# Patient Record
Sex: FEMALE | Race: WHITE | Employment: FULL TIME | URBAN - METROPOLITAN AREA
[De-identification: names, ages, dates, MRNs, and addresses within clinical notes are randomized per-mention and may not be internally consistent; named-entity substitution may affect disease eponyms.]

---

## 2018-02-17 ENCOUNTER — HOSPITAL ENCOUNTER (INPATIENT)
Age: 23
LOS: 1 days | Discharge: HOME | DRG: 603 | End: 2018-02-19
Attending: EMERGENCY MEDICINE | Admitting: INTERNAL MEDICINE
Payer: SUBSIDIZED

## 2018-02-17 DIAGNOSIS — L03.116 CELLULITIS OF LEFT LOWER EXTREMITY: Primary | ICD-10-CM

## 2018-02-17 PROBLEM — D72.825 BANDEMIA: Status: ACTIVE | Noted: 2018-02-17

## 2018-02-17 PROBLEM — E86.9 VOLUME DEPLETION: Status: ACTIVE | Noted: 2018-02-17

## 2018-02-17 PROBLEM — L03.90 CELLULITIS: Status: ACTIVE | Noted: 2018-02-17

## 2018-02-17 LAB
ALBUMIN SERPL-MCNC: 3.4 G/DL (ref 3.5–5)
ALBUMIN/GLOB SERPL: 0.8 {RATIO} (ref 1.2–3.5)
ALP SERPL-CCNC: 82 U/L (ref 50–136)
ALT SERPL-CCNC: 15 U/L (ref 12–65)
ANION GAP SERPL CALC-SCNC: 10 MMOL/L (ref 7–16)
AST SERPL-CCNC: 25 U/L (ref 15–37)
BACTERIA URNS QL MICRO: NORMAL /HPF
BASOPHILS # BLD: 0 K/UL (ref 0–0.2)
BASOPHILS NFR BLD: 0 % (ref 0–2)
BILIRUB SERPL-MCNC: 0.7 MG/DL (ref 0.2–1.1)
BUN SERPL-MCNC: 11 MG/DL (ref 6–23)
CALCIUM SERPL-MCNC: 8.9 MG/DL (ref 8.3–10.4)
CASTS URNS QL MICRO: NORMAL /LPF
CHLORIDE SERPL-SCNC: 105 MMOL/L (ref 98–107)
CO2 SERPL-SCNC: 23 MMOL/L (ref 21–32)
CREAT SERPL-MCNC: 1.06 MG/DL (ref 0.6–1)
DIFFERENTIAL METHOD BLD: ABNORMAL
EOSINOPHIL # BLD: 0 K/UL (ref 0–0.8)
EOSINOPHIL NFR BLD: 0 % (ref 0.5–7.8)
EPI CELLS #/AREA URNS HPF: NORMAL /HPF
ERYTHROCYTE [DISTWIDTH] IN BLOOD BY AUTOMATED COUNT: 13.1 % (ref 11.9–14.6)
FLUAV AG NPH QL IA: NEGATIVE
FLUBV AG NPH QL IA: NEGATIVE
GLOBULIN SER CALC-MCNC: 4.4 G/DL (ref 2.3–3.5)
GLUCOSE SERPL-MCNC: 99 MG/DL (ref 65–100)
HCG UR QL: NEGATIVE
HCT VFR BLD AUTO: 39.1 % (ref 35.8–46.3)
HGB BLD-MCNC: 14 G/DL (ref 11.7–15.4)
IMM GRANULOCYTES # BLD: 0.1 K/UL (ref 0–0.5)
IMM GRANULOCYTES NFR BLD AUTO: 0 % (ref 0–5)
LACTATE BLD-SCNC: 1 MMOL/L (ref 0.5–1.9)
LYMPHOCYTES # BLD: 1.3 K/UL (ref 0.5–4.6)
LYMPHOCYTES NFR BLD: 7 % (ref 13–44)
MCH RBC QN AUTO: 32.3 PG (ref 26.1–32.9)
MCHC RBC AUTO-ENTMCNC: 35.8 G/DL (ref 31.4–35)
MCV RBC AUTO: 90.1 FL (ref 79.6–97.8)
MONOCYTES # BLD: 0.9 K/UL (ref 0.1–1.3)
MONOCYTES NFR BLD: 4 % (ref 4–12)
NEUTS SEG # BLD: 18.2 K/UL (ref 1.7–8.2)
NEUTS SEG NFR BLD: 89 % (ref 43–78)
PLATELET # BLD AUTO: 282 K/UL (ref 150–450)
PMV BLD AUTO: 9.9 FL (ref 10.8–14.1)
POTASSIUM SERPL-SCNC: 4.4 MMOL/L (ref 3.5–5.1)
PROT SERPL-MCNC: 7.8 G/DL (ref 6.3–8.2)
RBC # BLD AUTO: 4.34 M/UL (ref 4.05–5.25)
RBC #/AREA URNS HPF: NORMAL /HPF
SODIUM SERPL-SCNC: 138 MMOL/L (ref 136–145)
WBC # BLD AUTO: 20.5 K/UL (ref 4.3–11.1)
WBC URNS QL MICRO: NORMAL /HPF

## 2018-02-17 PROCEDURE — 87804 INFLUENZA ASSAY W/OPTIC: CPT | Performed by: STUDENT IN AN ORGANIZED HEALTH CARE EDUCATION/TRAINING PROGRAM

## 2018-02-17 PROCEDURE — 99218 HC RM OBSERVATION: CPT

## 2018-02-17 PROCEDURE — 80053 COMPREHEN METABOLIC PANEL: CPT | Performed by: STUDENT IN AN ORGANIZED HEALTH CARE EDUCATION/TRAINING PROGRAM

## 2018-02-17 PROCEDURE — 74011000258 HC RX REV CODE- 258: Performed by: EMERGENCY MEDICINE

## 2018-02-17 PROCEDURE — 99284 EMERGENCY DEPT VISIT MOD MDM: CPT | Performed by: EMERGENCY MEDICINE

## 2018-02-17 PROCEDURE — 85025 COMPLETE CBC W/AUTO DIFF WBC: CPT | Performed by: STUDENT IN AN ORGANIZED HEALTH CARE EDUCATION/TRAINING PROGRAM

## 2018-02-17 PROCEDURE — 96365 THER/PROPH/DIAG IV INF INIT: CPT | Performed by: EMERGENCY MEDICINE

## 2018-02-17 PROCEDURE — 96361 HYDRATE IV INFUSION ADD-ON: CPT | Performed by: EMERGENCY MEDICINE

## 2018-02-17 PROCEDURE — 74011250637 HC RX REV CODE- 250/637: Performed by: INTERNAL MEDICINE

## 2018-02-17 PROCEDURE — 96366 THER/PROPH/DIAG IV INF ADDON: CPT | Performed by: EMERGENCY MEDICINE

## 2018-02-17 PROCEDURE — 81025 URINE PREGNANCY TEST: CPT

## 2018-02-17 PROCEDURE — 74011250636 HC RX REV CODE- 250/636: Performed by: EMERGENCY MEDICINE

## 2018-02-17 PROCEDURE — 87040 BLOOD CULTURE FOR BACTERIA: CPT | Performed by: EMERGENCY MEDICINE

## 2018-02-17 PROCEDURE — 96367 TX/PROPH/DG ADDL SEQ IV INF: CPT | Performed by: EMERGENCY MEDICINE

## 2018-02-17 PROCEDURE — 83605 ASSAY OF LACTIC ACID: CPT

## 2018-02-17 PROCEDURE — 81003 URINALYSIS AUTO W/O SCOPE: CPT | Performed by: EMERGENCY MEDICINE

## 2018-02-17 PROCEDURE — 81015 MICROSCOPIC EXAM OF URINE: CPT | Performed by: EMERGENCY MEDICINE

## 2018-02-17 PROCEDURE — 74011250636 HC RX REV CODE- 250/636: Performed by: INTERNAL MEDICINE

## 2018-02-17 RX ORDER — ENOXAPARIN SODIUM 100 MG/ML
40 INJECTION SUBCUTANEOUS EVERY 24 HOURS
Status: DISCONTINUED | OUTPATIENT
Start: 2018-02-17 | End: 2018-02-19 | Stop reason: HOSPADM

## 2018-02-17 RX ORDER — SODIUM CHLORIDE 0.9 % (FLUSH) 0.9 %
5-10 SYRINGE (ML) INJECTION AS NEEDED
Status: DISCONTINUED | OUTPATIENT
Start: 2018-02-17 | End: 2018-02-19 | Stop reason: HOSPADM

## 2018-02-17 RX ORDER — SODIUM CHLORIDE 0.9 % (FLUSH) 0.9 %
5-10 SYRINGE (ML) INJECTION EVERY 8 HOURS
Status: DISCONTINUED | OUTPATIENT
Start: 2018-02-17 | End: 2018-02-19 | Stop reason: HOSPADM

## 2018-02-17 RX ORDER — ONDANSETRON 2 MG/ML
4 INJECTION INTRAMUSCULAR; INTRAVENOUS
Status: DISCONTINUED | OUTPATIENT
Start: 2018-02-17 | End: 2018-02-19 | Stop reason: HOSPADM

## 2018-02-17 RX ORDER — HYDROCODONE BITARTRATE AND ACETAMINOPHEN 5; 325 MG/1; MG/1
1 TABLET ORAL
Status: DISCONTINUED | OUTPATIENT
Start: 2018-02-17 | End: 2018-02-19 | Stop reason: HOSPADM

## 2018-02-17 RX ORDER — DIPHENHYDRAMINE HCL 25 MG
25 CAPSULE ORAL
Status: DISCONTINUED | OUTPATIENT
Start: 2018-02-17 | End: 2018-02-19 | Stop reason: HOSPADM

## 2018-02-17 RX ORDER — SODIUM CHLORIDE 9 MG/ML
100 INJECTION, SOLUTION INTRAVENOUS CONTINUOUS
Status: DISCONTINUED | OUTPATIENT
Start: 2018-02-17 | End: 2018-02-19 | Stop reason: HOSPADM

## 2018-02-17 RX ORDER — VANCOMYCIN/0.9 % SOD CHLORIDE 1.5G/250ML
1500 PLASTIC BAG, INJECTION (ML) INTRAVENOUS
Status: COMPLETED | OUTPATIENT
Start: 2018-02-17 | End: 2018-02-17

## 2018-02-17 RX ORDER — POLYETHYLENE GLYCOL 3350 17 G/17G
17 POWDER, FOR SOLUTION ORAL
Status: DISCONTINUED | OUTPATIENT
Start: 2018-02-17 | End: 2018-02-19 | Stop reason: HOSPADM

## 2018-02-17 RX ADMIN — VANCOMYCIN HYDROCHLORIDE 1000 MG: 1 INJECTION, POWDER, LYOPHILIZED, FOR SOLUTION INTRAVENOUS at 23:23

## 2018-02-17 RX ADMIN — VANCOMYCIN HYDROCHLORIDE 1500 MG: 10 INJECTION, POWDER, LYOPHILIZED, FOR SOLUTION INTRAVENOUS at 14:54

## 2018-02-17 RX ADMIN — ACETAMINOPHEN 650 MG: 325 SOLUTION ORAL at 16:26

## 2018-02-17 RX ADMIN — HYDROCODONE BITARTRATE AND ACETAMINOPHEN 1 TABLET: 5; 325 TABLET ORAL at 23:02

## 2018-02-17 RX ADMIN — HYDROCODONE BITARTRATE AND ACETAMINOPHEN 1 TABLET: 5; 325 TABLET ORAL at 16:26

## 2018-02-17 RX ADMIN — ENOXAPARIN SODIUM 40 MG: 40 INJECTION SUBCUTANEOUS at 17:56

## 2018-02-17 RX ADMIN — SODIUM CHLORIDE 1000 ML: 900 INJECTION, SOLUTION INTRAVENOUS at 12:48

## 2018-02-17 RX ADMIN — Medication 10 ML: at 17:57

## 2018-02-17 RX ADMIN — Medication 10 ML: at 22:55

## 2018-02-17 RX ADMIN — SODIUM CHLORIDE 100 ML/HR: 900 INJECTION, SOLUTION INTRAVENOUS at 16:26

## 2018-02-17 RX ADMIN — SODIUM CHLORIDE 1000 ML: 900 INJECTION, SOLUTION INTRAVENOUS at 14:01

## 2018-02-17 RX ADMIN — CEFTRIAXONE SODIUM 1 G: 1 INJECTION, POWDER, FOR SOLUTION INTRAMUSCULAR; INTRAVENOUS at 13:00

## 2018-02-17 NOTE — IP AVS SNAPSHOT
Catalinatiana Stone 
 
 
 [address] 
[phone] Patient: Geovanna Rayn MRN: [id number] :1995 About your hospitalization You were admitted on:  2018 You last received care in the:  [facility] 6 MED SURG You were discharged on:  2018 Why you were hospitalized Your primary diagnosis was:  Cellulitis Your diagnoses also included:  Volume Depletion, Bandemia Follow-up Information Follow up With Details Comments Contact Info Harpal Padron MD Schedule an appointment as soon as possible for a visit on 3/1/2018 [place] office at 8:20am. [address] [facility] [address] 
[phone] On 2018 Discharge Orders None A check eva indicates which time of day the medication should be taken. My Medications START taking these medications Instructions Each Dose to Equal  
 Morning Noon Evening Bedtime  
 clindamycin 300 mg capsule Commonly known as:  CLEOCIN Take 1 Cap by mouth three (3) times daily for 8 days. 300 mg Where to Get Your Medications Information on where to get these meds will be given to you by the nurse or doctor. ! Ask your nurse or doctor about these medications  
  clindamycin 300 mg capsule Discharge Instructions DISCHARGE SUMMARY from Nurse PATIENT INSTRUCTIONS: 
 
 
F-face looks uneven A-arms unable to move or move unevenly S-speech slurred or non-existent T-time-call 911 as soon as signs and symptoms begin-DO NOT go  
 Back to bed or wait to see if you get better-TIME IS BRAIN. Warning Signs of HEART ATTACK Call 911 if you have these symptoms: 
? Chest discomfort. Most heart attacks involve discomfort in the center of the chest that lasts more than a few minutes, or that goes away and comes back. It can feel like uncomfortable pressure, squeezing, fullness, or pain. ? Discomfort in other areas of the upper body. Symptoms can include pain or discomfort in one or both arms, the back, neck, jaw, or stomach. ? Shortness of breath with or without chest discomfort. ? Other signs may include breaking out in a cold sweat, nausea, or lightheadedness. Don't wait more than five minutes to call [address]! Fast action can save your life. Calling 911 is almost always the fastest way to get lifesaving treatment. Emergency Medical Services staff can begin treatment when they arrive  up to an hour sooner than if someone gets to the hospital by car. The discharge information has been reviewed with the patient. The patient verbalized understanding. Discharge medications reviewed with the patient and appropriate educational materials and side effects teaching were provided. ___________________________________________________________________________________________________________________________________ Introducing [facility]! [facility] introduces [employer] patient portal. Now you can access parts of your medical record, email your doctor's office, and request medication refills online. 1. In your internet browser, go to [email or web address] 2. Click on the First Time User? Click Here link in the Sign In box. You will see the New Member Sign Up page. 3. Enter your [employer] Access Code exactly as it appears below. You will not need to use this code after youve completed the sign-up process. If you do not sign up before the expiration date, you must request a new code. · [employer] Access Code: [email or web address] Expires: 5/18/2018 11:39 AM 
 
4. Enter the last four digits of your Social Security Number (xxxx) and Date of Birth (mm/dd/yyyy) as indicated and click Submit. You will be taken to the next sign-up page. 5. Create a [employer] ID. This will be your [employer] login ID and cannot be changed, so think of one that is secure and easy to remember. 6. Create a [employer] password. You can change your password at any time. 7. Enter your Password Reset Question and Answer. This can be used at a later time if you forget your password. 8. Enter your e-mail address. You will receive e-mail notification when new information is available in [address] 9. Click Sign Up. You can now view and download portions of your medical record. 10. Click the Download Summary menu link to download a portable copy of your medical information. If you have questions, please visit the Frequently Asked Questions section of the [employer] website. Remember, [employer] is NOT to be used for urgent needs. For medical emergencies, dial 911. Now available from your iPhone and Android! Unresulted Labs-Please follow up with your PCP about these lab tests Order Current Status CULTURE, BLOOD Preliminary result CULTURE, BLOOD Preliminary result Providers Seen During Your Hospitalization Provider Specialty Primary office phone Zane Hester MD Emergency Medicine [phone] Shell Stanley MD Internal Medicine [phone] Your Primary Care Physician (PCP) Primary Care Physician Office Phone Office Fax Ck Nagy, [address] [facility] [phone] You are allergic to the following No active allergies Recent Documentation Height Weight BMI  
  
  
 1.575 m 90.7 kg 36.58 kg/m2 Emergency Contacts Name Discharge Info Relation Home Work Mobile Keshav Santoyo  Spouse [3] [phone] Patient Belongings The following personal items are in your possession at time of discharge: 
  Dental Appliances: None         Home Medications: None   Jewelry: Ring, Necklace  Clothing: Footwear, Shirt, Pants, Socks    Other Valuables: None Discharge Instructions Attachments/References CELLULITIS (ENGLISH) Patient Handouts Cellulitis: Care Instructions Your Care Instructions Cellulitis is a skin infection. It often occurs after a break in the skin from a scrape, cut, bite, or puncture, or after a rash. The doctor has checked you carefully, but problems can develop later. If you notice any problems or new symptoms, get medical treatment right away. Follow-up care is a key part of your treatment and safety. Be sure to make and go to all appointments, and call your doctor if you are having problems. It's also a good idea to know your test results and keep a list of the medicines you take. How can you care for yourself at home? · Take your antibiotics as directed. Do not stop taking them just because you feel better. You need to take the full course of antibiotics. · Prop up the infected area on pillows to reduce pain and swelling. Try to keep the area above the level of your heart as often as you can. · If your doctor told you how to care for your wound, follow your doctor's instructions. If you did not get instructions, follow this general advice: ¨ Wash the wound with clean water 2 times a day. Don't use hydrogen peroxide or alcohol, which can slow healing. ¨ You may cover the wound with a thin layer of petroleum jelly, such as Vaseline, and a nonstick bandage. ¨ Apply more petroleum jelly and replace the bandage as needed. · Be safe with medicines. Take pain medicines exactly as directed. ¨ If the doctor gave you a prescription medicine for pain, take it as prescribed. ¨ If you are not taking a prescription pain medicine, ask your doctor if you can take an over-the-counter medicine. To prevent cellulitis in the future · Try to prevent cuts, scrapes, or other injuries to your skin. Cellulitis most often occurs where there is a break in the skin. · If you get a scrape, cut, mild burn, or bite, wash the wound with clean water as soon as you can to help avoid infection. Don't use hydrogen peroxide or alcohol, which can slow healing. · If you have swelling in your legs (edema), support stockings and good skin care may help prevent leg sores and cellulitis. · Take care of your feet, especially if you have diabetes or other conditions that increase the risk of infection. Wear shoes and socks. Do not go barefoot. If you have athlete's foot or other skin problems on your feet, talk to your doctor about how to treat them. When should you call for help? Call your doctor now or seek immediate medical care if: 
? · You have signs that your infection is getting worse, such as: 
¨ Increased pain, swelling, warmth, or redness. ¨ Red streaks leading from the area. ¨ Pus draining from the area. ¨ A fever. ? · You get a rash. ? Watch closely for changes in your health, and be sure to contact your doctor if: 
? · You are not getting better after 1 day (24 hours). ? · You do not get better as expected. Where can you learn more? Go to http://sylvie-chela.info/. Mica Brown in the search box to learn more about \"Cellulitis: Care Instructions. \" Current as of: October 13, 2016 Content Version: 11.4 © 5778-5487 Ring. Care instructions adapted under license by [employer] (which disclaims liability or warranty for this information). If you have questions about a medical condition or this instruction, always ask your healthcare professional. [address] any warranty or liability for your use of this information. Please provide this summary of care documentation to your next provider. Signatures-by signing, you are acknowledging that this After Visit Summary has been reviewed with you and you have received a copy. Patient Signature:  ____________________________________________________________ Date:  ____________________________________________________________  
  
Briana Jennifer Provider Signature:  ____________________________________________________________ Date:  ____________________________________________________________

## 2018-02-17 NOTE — ED NOTES
Pt's face has become more and more flushed as time as gone on. Pt has now spiked a fever with nonstop chills.

## 2018-02-17 NOTE — ED NOTES
TRANSFER - OUT REPORT:    Verbal report given to Miriam Alvarez RN(name) on Tiffanie  being transferred to [facility](unit) for routine progression of care       Report consisted of patients Situation, Background, Assessment and   Recommendations(SBAR). Information from the following report(s) SBAR, Kardex, ED Summary and Recent Results was reviewed with the receiving nurse. Lines:   Peripheral IV 02/17/18 Left Antecubital (Active)   Site Assessment Clean, dry, & intact 2/17/2018 12:27 PM   Phlebitis Assessment 0 2/17/2018 12:27 PM   Infiltration Assessment 0 2/17/2018 12:27 PM   Dressing Status Clean, dry, & intact 2/17/2018 12:27 PM   Hub Color/Line Status Pink 2/17/2018 12:27 PM   Alcohol Cap Used No 2/17/2018 12:27 PM        Opportunity for questions and clarification was provided.

## 2018-02-17 NOTE — H&P
History and Physical    Patient: Judit Blum MRN: [id number]  SSN: [id number]    YOB: 1995  Age: 20 y.o. Sex: female      Subjective:      Judit Blum is a 20 y.o. female who presents to ER for left lower leg redness and high fever and chills today. She reports similar episodes in September 2016 and 2017. Both times, she was admitted to hospital and given Vancomycin and then discharged home with Clindamycin and infection went away. She denied other past medical history. No recent injury to skin. No insect bite. No pain at the groins. No abdominal pain. No lymphadenopathy. Appetite is poor. She has body ache \" all over\". Past medical history  As mentioned similar episodes in the past.   She was born with one functioning kidney. No CKD. Past surgical history :   None    Family history :   No hereditary condition. No Known Allergies    Review of Systems:  Positive for fever, chills. Body ache,   Poor appetite  Poor oral intake  Leg pain  No chest pain  No shortness of breath, no cough, no sore throat  No abdominal pain, no blood per rectum, no vomiting  No accident  No motor weakness. No impairment of sensation  No dysuria  Not pregnant, no missed period    Objective:     Vitals:    02/17/18 1450 02/17/18 1520 02/17/18 1612 02/17/18 1613   BP: 114/62 119/65 123/71    Pulse:       Resp:       Temp:    100.4 °F (38 °C)   SpO2: 97% 99% 98%    Weight:       Height:            Physical Exam:  General:                    The patient is a pleasant young woman who is acutely ill   Head:                                   Normocephalic/atraumatic. Eyes:                                   No palpebral pallor or scleral icterus. ENT:                                    External auricular and nasal exam within normal limits.                                                Mucous membranes are dry  Neck:                                   Supple, non-tender, no JVD.  Lungs:                       Clear to auscultation bilaterally without wheezes or crackles. No respiratory distress or accessory muscle use. Heart:                                  Regular rate and rhythm, without murmurs, rubs, or gallops. Abdomen:                  Soft, non-tender, non-distended with normoactive bowel sounds. Genitourinary:           No tenderness over the bladder or bilateral CVAs. Extremities:               Without clubbing, cyanosis, or edema. Skin:                                    left lower leg with red rashes from ankle to almost upper shin. Poorly demarcated lesion. no fluctuation. Pulses:                      Radial and dorsalis pedis pulses present 2+ bilaterally. Capillary refill <2s. Neurologic:                CN II-XII grossly intact and symmetrical.                                               Moving all four extremities well with no focal deficits. Psychiatric:                appropriate affect. Alert and oriented x 3    Recent Results (from the past 24 hour(s))   INFLUENZA A & B AG (RAPID TEST)    Collection Time: 02/17/18 12:15 PM   Result Value Ref Range    Influenza A Ag NEGATIVE  NEG      Influenza B Ag NEGATIVE  NEG     CBC WITH AUTOMATED DIFF    Collection Time: 02/17/18 12:24 PM   Result Value Ref Range    WBC 20.5 (H) 4.3 - 11.1 K/uL    RBC 4.34 4.05 - 5.25 M/uL    HGB 14.0 11.7 - 15.4 g/dL    HCT 39.1 35.8 - 46.3 %    MCV 90.1 79.6 - 97.8 FL    MCH 32.3 26.1 - 32.9 PG    MCHC 35.8 (H) 31.4 - 35.0 g/dL    RDW 13.1 11.9 - 14.6 %    PLATELET 737 273 - 329 K/uL    MPV 9.9 (L) 10.8 - 14.1 FL    DF AUTOMATED      NEUTROPHILS 89 (H) 43 - 78 %    LYMPHOCYTES 7 (L) 13 - 44 %    MONOCYTES 4 4.0 - 12.0 %    EOSINOPHILS 0 (L) 0.5 - 7.8 %    BASOPHILS 0 0.0 - 2.0 %    IMMATURE GRANULOCYTES 0 0.0 - 5.0 %    ABS. NEUTROPHILS 18.2 (H) 1.7 - 8.2 K/UL    ABS.  LYMPHOCYTES 1.3 0.5 - 4.6 K/UL    ABS. MONOCYTES 0.9 0.1 - 1.3 K/UL    ABS. EOSINOPHILS 0.0 0.0 - 0.8 K/UL    ABS. BASOPHILS 0.0 0.0 - 0.2 K/UL    ABS. IMM. GRANS. 0.1 0.0 - 0.5 K/UL   METABOLIC PANEL, COMPREHENSIVE    Collection Time: 02/17/18 12:24 PM   Result Value Ref Range    Sodium 138 136 - 145 mmol/L    Potassium 4.4 3.5 - 5.1 mmol/L    Chloride 105 98 - 107 mmol/L    CO2 23 21 - 32 mmol/L    Anion gap 10 7 - 16 mmol/L    Glucose 99 65 - 100 mg/dL    BUN 11 6 - 23 MG/DL    Creatinine 1.06 (H) 0.6 - 1.0 MG/DL    GFR est AA >60 >60 ml/min/1.73m2    GFR est non-AA >60 >60 ml/min/1.73m2    Calcium 8.9 8.3 - 10.4 MG/DL    Bilirubin, total 0.7 0.2 - 1.1 MG/DL    ALT (SGPT) 15 12 - 65 U/L    AST (SGOT) 25 15 - 37 U/L    Alk. phosphatase 82 50 - 136 U/L    Protein, total 7.8 6.3 - 8.2 g/dL    Albumin 3.4 (L) 3.5 - 5.0 g/dL    Globulin 4.4 (H) 2.3 - 3.5 g/dL    A-G Ratio 0.8 (L) 1.2 - 3.5     HCG URINE, QL. - POC    Collection Time: 02/17/18 12:59 PM   Result Value Ref Range    Pregnancy test,urine (POC) NEGATIVE  NEG     URINE MICROSCOPIC    Collection Time: 02/17/18  1:01 PM   Result Value Ref Range    WBC 3-5 0 /hpf    RBC 0-3 0 /hpf    Epithelial cells 10-20 0 /hpf    Bacteria TRACE 0 /hpf    Casts 0-3 0 /lpf   POC LACTIC ACID    Collection Time: 02/17/18  2:25 PM   Result Value Ref Range    Lactic Acid (POC) 1.0 0.5 - 1.9 mmol/L           Assessment:     Hospital Problems  Never Reviewed          Codes Class Noted POA    Cellulitis ICD-10-CM: L03.90  ICD-9-CM: 682.9  2/17/2018 Unknown            Volume depletion  leukocytosis    Plan:     Admit for iv antibiotic and iv fluid  Give symptomatic treatment for pain, fever. DVT prophylaxis with Lovenox  Monitor CBC, BMP  Review record from past admissions. May need further work-ups due to recurrences. I have discussed with patient regarding advance directive. Patient would like to have a full-code status. I have discussed the plan of care with patient.        Signed By: Arianne Medina MD     February 17, 2018

## 2018-02-17 NOTE — PROGRESS NOTES
TRANSFER - IN REPORT:    Verbal report received from Denilson Rapp RN(name) on [place]  being received from ER(unit) for routine progression of care      Report consisted of patients Situation, Background, Assessment and   Recommendations(SBAR). Information from the following report(s) SBAR was reviewed with the receiving nurse. Opportunity for questions and clarification was provided. Assessment completed upon patients arrival to unit and care assumed.

## 2018-02-17 NOTE — ED NOTES
Pt is resting at bedside, two blankets. Pt states she still has the chills, but a third blanket would cause a temperature increase. Pt states she understands.

## 2018-02-17 NOTE — PROGRESS NOTES
Patient being admitted to floor. Brief visit with patient. Saad east. Spouse- Zari Humairaaugusto. No advance directives on file. Will follow.     Lolis Aguilar, staff Jaxson [address], [address] [facility]  /   [email or web address]

## 2018-02-17 NOTE — PROGRESS NOTES
Pharmacokinetic Consult to Pharmacist    Davida Valentine is a 20 y.o. female being treated for SSTI. Height: 5' 2\" (157.5 cm)  Weight: 90.7 kg (200 lb)  Lab Results   Component Value Date/Time    BUN 11 02/17/2018 12:24 PM    Creatinine 1.06 (H) 02/17/2018 12:24 PM    WBC 20.5 (H) 02/17/2018 12:24 PM    Lactic Acid (POC) 1.0 02/17/2018 02:25 PM      Estimated Creatinine Clearance: 86.4 mL/min (based on Cr of 1.06). CULTURES:  All Micro Results     Procedure Component Value Units Date/Time    CULTURE, BLOOD [[id number]] Collected:  02/17/18 1224    Order Status:  Completed Specimen:  Blood from Blood Updated:  02/17/18 1431    CULTURE, BLOOD [[id number]] Collected:  02/17/18 1235    Order Status:  Completed Specimen:  Blood from Blood Updated:  02/17/18 1431    INFLUENZA A & B AG (RAPID TEST) [[id number]] Collected:  02/17/18 1215    Order Status:  Completed Specimen:  Nasopharyngeal from Nasal washing Updated:  02/17/18 1248     Influenza A Ag NEGATIVE          NEGATIVE FOR THE PRESENCE OF INFLUENZA A ANTIGEN  INFECTION DUE TO INFLUENZA A CANNOT BE RULED OUT. BECAUSE THE ANTIGEN PRESENT IN THE SAMPLE MAY BE BELOW  THE DETECTION LIMIT OF THE TEST. A NEGATIVE TEST IS PRESUMPTIVE AND IT IS RECOMMENDED THAT THESE RESULTS BE CONFIRMED BY VIRAL CULTURE OR AN FDA-CLEARED INFLUENZA A AND B MOLECULAR ASSAY. Influenza B Ag NEGATIVE          NEGATIVE FOR THE PRESENCE OF INFLUENZA B ANTIGEN  INFECTION DUE TO INFLUENZA B CANNOT BE RULED OUT. BECAUSE THE ANTIGEN PRESENT IN THE SAMPLE MAY BE BELOW  THE DETECTION LIMIT OF THE TEST. A NEGATIVE TEST IS PRESUMPTIVE AND IT IS RECOMMENDED THAT THESE RESULTS BE CONFIRMED BY VIRAL CULTURE OR AN FDA-CLEARED INFLUENZA A AND B MOLECULAR ASSAY. Day 1 of vancomycin. Goal trough is 12-18. I will schedule 1000mg q8h to begin tonight. Vancomycin 1500mg X1 loading dose received this afternoon. Pharmacist will continue to follow patient.   Please call with any questions. Thank you,  Jaden Plunkett.  Basim Ortiz, PharmD  Clinical Pharmacist  [phone]

## 2018-02-17 NOTE — ED PROVIDER NOTES
HPI Comments: Here with diffuse body aches and temperature up to 102.9 at home. Seeing this fever she took 2 Aleve. Her typical flu symptoms such as cough sore throat or runny nose but does have redness to her left lower extremity from the calf on down. This is been fairly rapidly progressive per patient. She's had cellulitis to the same lower leg once yearly over the last 2 years this being probably the third event. Does not recall any injury or provoking event regarding this. No vomiting. Patient is a 20 y.o. female presenting with general illness. The history is provided by the patient. Generalized Body Aches   This is a new problem. The current episode started 3 to 5 hours ago. The problem has not changed since onset. Associated symptoms comments: Reddened area left lower leg. Nothing aggravates the symptoms. Nothing relieves the symptoms. She has tried nothing for the symptoms. No past medical history on file. No past surgical history on file. No family history on file. Social History     Social History    Marital status:      Spouse name: N/A    Number of children: N/A    Years of education: N/A     Occupational History    Not on file. Social History Main Topics    Smoking status: Not on file    Smokeless tobacco: Not on file    Alcohol use Not on file    Drug use: Not on file    Sexual activity: Not on file     Other Topics Concern    Not on file     Social History Narrative         ALLERGIES: Review of patient's allergies indicates not on file. Review of Systems   Constitutional: Negative for chills and fever. Respiratory: Negative. Cardiovascular: Negative. Genitourinary: Negative. Skin: Positive for color change. Negative for pallor. All other systems reviewed and are negative.       Vitals:    02/17/18 1149   BP: 125/67   Pulse: (!) 140   Resp: 18   Temp: 99.3 °F (37.4 °C)   SpO2: 99%            Physical Exam   Constitutional: She appears well-developed and well-nourished. No distress. HENT:   Head: Atraumatic. Mouth/Throat: Oropharynx is clear and moist.   Eyes: No scleral icterus. Neck: Neck supple. Cardiovascular: Normal rate and intact distal pulses. Pulmonary/Chest: Effort normal. No respiratory distress. She has no wheezes. Abdominal: Soft. There is no tenderness. There is no rebound. Musculoskeletal: She exhibits edema and tenderness. Neurological: She is alert. She exhibits normal muscle tone. Coordination normal.   Skin: Skin is warm and dry. Psychiatric: Her behavior is normal. Thought content normal.   Nursing note and vitals reviewed. MDM  Number of Diagnoses or Management Options  Cellulitis of left lower extremity:   Diagnosis management comments: Tachycardic and new onset of cellulitis to lle. History of same and each of 2 prior time with hospital stay of almost one week.  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

## 2018-02-17 NOTE — PROGRESS NOTES
Primary Nurse Moraima Dallas (Miguel A Ruiz) Lizzy Solis and Flavia Geiger, RN performed a dual skin assessment, no breakdown noted but bilateral hands are dark red, and bilateral legs are bright red and annemarie. Patient complains of tenderness in the left leg and pain on a numeric scale of 5 out of 10 with no need for pain medicine at a five. Patient oriented to the room and the use of the call light with verbal and demonstrated feedback. Patient's heart rate is 125 notified Dr. Ruperto Perez regarding the heart rate. No new orders given. Will continue to monitor patient.

## 2018-02-18 LAB
BASOPHILS # BLD: 0 K/UL (ref 0–0.2)
BASOPHILS NFR BLD: 0 % (ref 0–2)
DIFFERENTIAL METHOD BLD: ABNORMAL
EOSINOPHIL # BLD: 0 K/UL (ref 0–0.8)
EOSINOPHIL NFR BLD: 0 % (ref 0.5–7.8)
ERYTHROCYTE [DISTWIDTH] IN BLOOD BY AUTOMATED COUNT: 13.2 % (ref 11.9–14.6)
HCT VFR BLD AUTO: 35.1 % (ref 35.8–46.3)
HGB BLD-MCNC: 12.2 G/DL (ref 11.7–15.4)
IMM GRANULOCYTES # BLD: 0 K/UL (ref 0–0.5)
IMM GRANULOCYTES NFR BLD AUTO: 0 % (ref 0–5)
LYMPHOCYTES # BLD: 2.3 K/UL (ref 0.5–4.6)
LYMPHOCYTES NFR BLD: 38 % (ref 13–44)
MCH RBC QN AUTO: 31.6 PG (ref 26.1–32.9)
MCHC RBC AUTO-ENTMCNC: 34.8 G/DL (ref 31.4–35)
MCV RBC AUTO: 90.9 FL (ref 79.6–97.8)
MONOCYTES # BLD: 0.4 K/UL (ref 0.1–1.3)
MONOCYTES NFR BLD: 7 % (ref 4–12)
NEUTS SEG # BLD: 3.4 K/UL (ref 1.7–8.2)
NEUTS SEG NFR BLD: 55 % (ref 43–78)
PLATELET # BLD AUTO: 200 K/UL (ref 150–450)
PMV BLD AUTO: 8.9 FL (ref 10.8–14.1)
RBC # BLD AUTO: 3.86 M/UL (ref 4.05–5.25)
VANCOMYCIN TROUGH SERPL-MCNC: 13.6 UG/ML (ref 5–20)
WBC # BLD AUTO: 6.2 K/UL (ref 4.3–11.1)

## 2018-02-18 PROCEDURE — 36415 COLL VENOUS BLD VENIPUNCTURE: CPT | Performed by: INTERNAL MEDICINE

## 2018-02-18 PROCEDURE — 74011250636 HC RX REV CODE- 250/636: Performed by: INTERNAL MEDICINE

## 2018-02-18 PROCEDURE — 74011250637 HC RX REV CODE- 250/637: Performed by: INTERNAL MEDICINE

## 2018-02-18 PROCEDURE — 99218 HC RM OBSERVATION: CPT

## 2018-02-18 PROCEDURE — 80202 ASSAY OF VANCOMYCIN: CPT | Performed by: INTERNAL MEDICINE

## 2018-02-18 PROCEDURE — 85025 COMPLETE CBC W/AUTO DIFF WBC: CPT | Performed by: INTERNAL MEDICINE

## 2018-02-18 PROCEDURE — 65270000029 HC RM PRIVATE

## 2018-02-18 RX ORDER — KETOROLAC TROMETHAMINE 30 MG/ML
15 INJECTION, SOLUTION INTRAMUSCULAR; INTRAVENOUS
Status: COMPLETED | OUTPATIENT
Start: 2018-02-18 | End: 2018-02-18

## 2018-02-18 RX ORDER — NYSTATIN 100000 [USP'U]/G
POWDER TOPICAL 2 TIMES DAILY
Status: DISCONTINUED | OUTPATIENT
Start: 2018-02-18 | End: 2018-02-19 | Stop reason: HOSPADM

## 2018-02-18 RX ADMIN — VANCOMYCIN HYDROCHLORIDE 1000 MG: 1 INJECTION, POWDER, LYOPHILIZED, FOR SOLUTION INTRAVENOUS at 06:09

## 2018-02-18 RX ADMIN — VANCOMYCIN HYDROCHLORIDE 1000 MG: 1 INJECTION, POWDER, LYOPHILIZED, FOR SOLUTION INTRAVENOUS at 14:24

## 2018-02-18 RX ADMIN — HYDROCODONE BITARTRATE AND ACETAMINOPHEN 1 TABLET: 5; 325 TABLET ORAL at 06:34

## 2018-02-18 RX ADMIN — Medication 10 ML: at 06:40

## 2018-02-18 RX ADMIN — KETOROLAC TROMETHAMINE 15 MG: 30 INJECTION, SOLUTION INTRAMUSCULAR at 10:26

## 2018-02-18 RX ADMIN — ENOXAPARIN SODIUM 40 MG: 40 INJECTION SUBCUTANEOUS at 17:18

## 2018-02-18 RX ADMIN — VANCOMYCIN HYDROCHLORIDE 1000 MG: 1 INJECTION, POWDER, LYOPHILIZED, FOR SOLUTION INTRAVENOUS at 22:46

## 2018-02-18 RX ADMIN — Medication 10 ML: at 22:46

## 2018-02-18 RX ADMIN — HYDROCODONE BITARTRATE AND ACETAMINOPHEN 1 TABLET: 5; 325 TABLET ORAL at 18:22

## 2018-02-18 RX ADMIN — NYSTATIN: 100000 POWDER TOPICAL at 17:17

## 2018-02-18 RX ADMIN — SODIUM CHLORIDE 100 ML/HR: 900 INJECTION, SOLUTION INTRAVENOUS at 18:22

## 2018-02-18 RX ADMIN — Medication 10 ML: at 14:24

## 2018-02-18 NOTE — PROGRESS NOTES
Patient had an uneventful night, medicated for pain to her rt. Leg. Redness to rt. Leg  Has spread further. Gracie Krishnan

## 2018-02-18 NOTE — PROGRESS NOTES
As  Hospitalist Progress Note    Subjective:   Daily Progress Note: 2018 10:23 AM    Ms. Ana Bacon is a 19 yo white female who presented  for left leg redness and erythema and is on vancomycin IV for cellulitis. Has had cellulitis twice before in the leg that improved with iv vancomycin/oral clindamycin. Denies trauma to LEs, animal bites/scratches or any IV use. Endorses chest pressure that is worsened with breathing and is reproducible. Not associated with sob, diaphoresis nor exertion. Current Facility-Administered Medications   Medication Dose Route Frequency    ketorolac (TORADOL) injection 15 mg  15 mg IntraVENous NOW    sodium chloride (NS) flush 5-10 mL  5-10 mL IntraVENous Q8H    sodium chloride (NS) flush 5-10 mL  5-10 mL IntraVENous PRN    acetaminophen (TYLENOL) solution 650 mg  650 mg Oral Q6H PRN    enoxaparin (LOVENOX) injection 40 mg  40 mg SubCUTAneous Q24H    0.9% sodium chloride infusion  100 mL/hr IntraVENous CONTINUOUS    HYDROcodone-acetaminophen (NORCO) 5-325 mg per tablet 1 Tab  1 Tab Oral Q6H PRN    ondansetron (ZOFRAN) injection 4 mg  4 mg IntraVENous Q8H PRN    polyethylene glycol (MIRALAX) packet 17 g  17 g Oral DAILY PRN    diphenhydrAMINE (BENADRYL) capsule 25 mg  25 mg Oral QHS PRN    vancomycin (VANCOCIN) 1,000 mg in 0.9% sodium chloride (MBP/ADV) 250 mL  1 g IntraVENous Q8H        Review of Systems  A comprehensive review of systems was negative except for that written in the HPI.     Objective:     Visit Vitals    /79 (BP 1 Location: Right arm, BP Patient Position: At rest)    Pulse 90    Temp 97.8 °F (36.6 °C)    Resp 20    Ht 5' 2\" (1.575 m)    Wt 90.7 kg (200 lb)    SpO2 99%    BMI 36.58 kg/m2      O2 Device: Room air    Temp (24hrs), Av.9 °F (37.2 °C), Min:97.8 °F (36.6 °C), Max:100.4 °F (38 °C)          1901 -  0700  In: 240 [P.O.:240]  Out: -     General: awake, alert, oriented  Eyes; non icteric, EOMI  Neck; supple  CV: RRR  Pulm; CTAB  Ext: left calf mildly erythematous with 1+ pitting edema, palpable pedal pulses    Additional comments:I reviewed the patient's new clinical lab test results. j    Data Review    Recent Results (from the past 24 hour(s))   INFLUENZA A & B AG (RAPID TEST)    Collection Time: 02/17/18 12:15 PM   Result Value Ref Range    Influenza A Ag NEGATIVE  NEG      Influenza B Ag NEGATIVE  NEG     CBC WITH AUTOMATED DIFF    Collection Time: 02/17/18 12:24 PM   Result Value Ref Range    WBC 20.5 (H) 4.3 - 11.1 K/uL    RBC 4.34 4.05 - 5.25 M/uL    HGB 14.0 11.7 - 15.4 g/dL    HCT 39.1 35.8 - 46.3 %    MCV 90.1 79.6 - 97.8 FL    MCH 32.3 26.1 - 32.9 PG    MCHC 35.8 (H) 31.4 - 35.0 g/dL    RDW 13.1 11.9 - 14.6 %    PLATELET 599 367 - 173 K/uL    MPV 9.9 (L) 10.8 - 14.1 FL    DF AUTOMATED      NEUTROPHILS 89 (H) 43 - 78 %    LYMPHOCYTES 7 (L) 13 - 44 %    MONOCYTES 4 4.0 - 12.0 %    EOSINOPHILS 0 (L) 0.5 - 7.8 %    BASOPHILS 0 0.0 - 2.0 %    IMMATURE GRANULOCYTES 0 0.0 - 5.0 %    ABS. NEUTROPHILS 18.2 (H) 1.7 - 8.2 K/UL    ABS. LYMPHOCYTES 1.3 0.5 - 4.6 K/UL    ABS. MONOCYTES 0.9 0.1 - 1.3 K/UL    ABS. EOSINOPHILS 0.0 0.0 - 0.8 K/UL    ABS. BASOPHILS 0.0 0.0 - 0.2 K/UL    ABS. IMM. GRANS. 0.1 0.0 - 0.5 K/UL   METABOLIC PANEL, COMPREHENSIVE    Collection Time: 02/17/18 12:24 PM   Result Value Ref Range    Sodium 138 136 - 145 mmol/L    Potassium 4.4 3.5 - 5.1 mmol/L    Chloride 105 98 - 107 mmol/L    CO2 23 21 - 32 mmol/L    Anion gap 10 7 - 16 mmol/L    Glucose 99 65 - 100 mg/dL    BUN 11 6 - 23 MG/DL    Creatinine 1.06 (H) 0.6 - 1.0 MG/DL    GFR est AA >60 >60 ml/min/1.73m2    GFR est non-AA >60 >60 ml/min/1.73m2    Calcium 8.9 8.3 - 10.4 MG/DL    Bilirubin, total 0.7 0.2 - 1.1 MG/DL    ALT (SGPT) 15 12 - 65 U/L    AST (SGOT) 25 15 - 37 U/L    Alk.  phosphatase 82 50 - 136 U/L    Protein, total 7.8 6.3 - 8.2 g/dL    Albumin 3.4 (L) 3.5 - 5.0 g/dL    Globulin 4.4 (H) 2.3 - 3.5 g/dL    A-G Ratio 0.8 (L) 1.2 - 3.5 CULTURE, BLOOD    Collection Time: 02/17/18 12:24 PM   Result Value Ref Range    Special Requests: LEFT  Antecubital        Culture result: NO GROWTH AFTER 15 HOURS     CULTURE, BLOOD    Collection Time: 02/17/18 12:35 PM   Result Value Ref Range    Special Requests: LEFT  FOREARM        Culture result: NO GROWTH AFTER 15 HOURS     HCG URINE, QL. - POC    Collection Time: 02/17/18 12:59 PM   Result Value Ref Range    Pregnancy test,urine (POC) NEGATIVE  NEG     URINE MICROSCOPIC    Collection Time: 02/17/18  1:01 PM   Result Value Ref Range    WBC 3-5 0 /hpf    RBC 0-3 0 /hpf    Epithelial cells 10-20 0 /hpf    Bacteria TRACE 0 /hpf    Casts 0-3 0 /lpf   POC LACTIC ACID    Collection Time: 02/17/18  2:25 PM   Result Value Ref Range    Lactic Acid (POC) 1.0 0.5 - 1.9 mmol/L   CBC WITH AUTOMATED DIFF    Collection Time: 02/18/18  5:38 AM   Result Value Ref Range    WBC 6.2 4.3 - 11.1 K/uL    RBC 3.86 (L) 4.05 - 5.25 M/uL    HGB 12.2 11.7 - 15.4 g/dL    HCT 35.1 (L) 35.8 - 46.3 %    MCV 90.9 79.6 - 97.8 FL    MCH 31.6 26.1 - 32.9 PG    MCHC 34.8 31.4 - 35.0 g/dL    RDW 13.2 11.9 - 14.6 %    PLATELET 014 703 - 677 K/uL    MPV 8.9 (L) 10.8 - 14.1 FL    DF AUTOMATED      NEUTROPHILS 55 43 - 78 %    LYMPHOCYTES 38 13 - 44 %    MONOCYTES 7 4.0 - 12.0 %    EOSINOPHILS 0 (L) 0.5 - 7.8 %    BASOPHILS 0 0.0 - 2.0 %    IMMATURE GRANULOCYTES 0 0.0 - 5.0 %    ABS. NEUTROPHILS 3.4 1.7 - 8.2 K/UL    ABS. LYMPHOCYTES 2.3 0.5 - 4.6 K/UL    ABS. MONOCYTES 0.4 0.1 - 1.3 K/UL    ABS. EOSINOPHILS 0.0 0.0 - 0.8 K/UL    ABS. BASOPHILS 0.0 0.0 - 0.2 K/UL    ABS. IMM. GRANS. 0.0 0.0 - 0.5 K/UL         Assessment/Plan:     Principal Problem:    Cellulitis (2/17/2018)    Active Problems:    Volume depletion (2/17/2018)      Bandemia (2/17/2018)      Leukocytosis resolved to 6 from 20. Continue iv vancomycin, change to oral clindamycin tomorrow if she continues to improve. toradol 15 mg iv x one for musculoskeletal chest pain.    Home tomorrow?     Care Plan discussed with: Patient/Family and Nurse      Signed By: Vernell Anna MD     February 18, 2018

## 2018-02-18 NOTE — PROGRESS NOTES
Problem: Falls - Risk of  Goal: *Absence of Falls  Document Omi Fall Risk and appropriate interventions in the flowsheet.    Outcome: Progressing Towards Goal  Fall Risk Interventions:            Medication Interventions: Patient to call before getting OOB, Teach patient to arise slowly

## 2018-02-19 VITALS
HEART RATE: 94 BPM | BODY MASS INDEX: 36.8 KG/M2 | HEIGHT: 62 IN | TEMPERATURE: 98.4 F | RESPIRATION RATE: 20 BRPM | OXYGEN SATURATION: 90 % | DIASTOLIC BLOOD PRESSURE: 76 MMHG | WEIGHT: 200 LBS | SYSTOLIC BLOOD PRESSURE: 131 MMHG

## 2018-02-19 PROCEDURE — 74011250637 HC RX REV CODE- 250/637: Performed by: INTERNAL MEDICINE

## 2018-02-19 PROCEDURE — 74011250636 HC RX REV CODE- 250/636: Performed by: INTERNAL MEDICINE

## 2018-02-19 RX ORDER — CLINDAMYCIN HYDROCHLORIDE 300 MG/1
300 CAPSULE ORAL 3 TIMES DAILY
Qty: 24 CAP | Refills: 0 | Status: SHIPPED | OUTPATIENT
Start: 2018-02-19 | End: 2018-02-27

## 2018-02-19 RX ADMIN — NYSTATIN: 100000 POWDER TOPICAL at 09:20

## 2018-02-19 RX ADMIN — SODIUM CHLORIDE 100 ML/HR: 900 INJECTION, SOLUTION INTRAVENOUS at 03:47

## 2018-02-19 RX ADMIN — HYDROCODONE BITARTRATE AND ACETAMINOPHEN 1 TABLET: 5; 325 TABLET ORAL at 09:20

## 2018-02-19 RX ADMIN — Medication 10 ML: at 06:26

## 2018-02-19 RX ADMIN — VANCOMYCIN HYDROCHLORIDE 1000 MG: 1 INJECTION, POWDER, LYOPHILIZED, FOR SOLUTION INTRAVENOUS at 09:19

## 2018-02-19 NOTE — PROGRESS NOTES
Discharge instructions and prescriptions provided and explained to patient, patient voiced understanding. Medication side effect sheet reviewed with pt. No home meds or valuables to return. Opportunity for questions provided. Pt has called ride & Instructed to call once ready to leave the floor.

## 2018-02-19 NOTE — PROGRESS NOTES
Hourly rounds complete with patient throughout the shift. Needs met at this time. Will continue to monitor patient and report off to day shift RN.

## 2018-02-19 NOTE — PROGRESS NOTES
Pt resting quietly in bed. States she is still having pain in chest.  Alert and oriented x4. Lungs sounds clear bilaterally with respirations even and unlabored. Bowel sounds active in all quadrants. +2 pulses bilaterally in upper and lower extremities. Rash on left lower leg. IV infusing with no signs of infiltration or phlebitis. Instructed to call for assistance. Call light in reach. Voiced understanding and identified call light.

## 2018-02-19 NOTE — DISCHARGE INSTRUCTIONS
DISCHARGE SUMMARY from Nurse    PATIENT INSTRUCTIONS:    After general anesthesia or intravenous sedation, for 24 hours or while taking prescription Narcotics:  · Limit your activities  · Do not drive and operate hazardous machinery  · Do not make important personal or business decisions  · Do  not drink alcoholic beverages  · If you have not urinated within 8 hours after discharge, please contact your surgeon on call. Report the following to your surgeon:  · Excessive pain, swelling, redness or odor of or around the surgical area  · Temperature over 100.5  · Nausea and vomiting lasting longer than 4 hours or if unable to take medications  · Any signs of decreased circulation or nerve impairment to extremity: change in color, persistent  numbness, tingling, coldness or increase pain  · Any questions    What to do at Home:  Recommended activity: Activity as tolerated,     If you experience any of the following symptoms fever, chills, new or unrelieved pain, persistent nausea or vomiting, worsening redness or swelling in left leg, please follow up with PCP. *  Please give a list of your current medications to your Primary Care Provider. *  Please update this list whenever your medications are discontinued, doses are      changed, or new medications (including over-the-counter products) are added. *  Please carry medication information at all times in case of emergency situations. These are general instructions for a healthy lifestyle:    No smoking/ No tobacco products/ Avoid exposure to second hand smoke  Surgeon General's Warning:  Quitting smoking now greatly reduces serious risk to your health.     Obesity, smoking, and sedentary lifestyle greatly increases your risk for illness    A healthy diet, regular physical exercise & weight monitoring are important for maintaining a healthy lifestyle    You may be retaining fluid if you have a history of heart failure or if you experience any of the following symptoms:  Weight gain of 3 pounds or more overnight or 5 pounds in a week, increased swelling in our hands or feet or shortness of breath while lying flat in bed. Please call your doctor as soon as you notice any of these symptoms; do not wait until your next office visit. Recognize signs and symptoms of STROKE:    F-face looks uneven    A-arms unable to move or move unevenly    S-speech slurred or non-existent    T-time-call 911 as soon as signs and symptoms begin-DO NOT go       Back to bed or wait to see if you get better-TIME IS BRAIN. Warning Signs of HEART ATTACK     Call 911 if you have these symptoms:   Chest discomfort. Most heart attacks involve discomfort in the center of the chest that lasts more than a few minutes, or that goes away and comes back. It can feel like uncomfortable pressure, squeezing, fullness, or pain.  Discomfort in other areas of the upper body. Symptoms can include pain or discomfort in one or both arms, the back, neck, jaw, or stomach.  Shortness of breath with or without chest discomfort.  Other signs may include breaking out in a cold sweat, nausea, or lightheadedness. Don't wait more than five minutes to call 911 - MINUTES MATTER! Fast action can save your life. Calling 911 is almost always the fastest way to get lifesaving treatment. Emergency Medical Services staff can begin treatment when they arrive -- up to an hour sooner than if someone gets to the hospital by car. The discharge information has been reviewed with the patient. The patient verbalized understanding. Discharge medications reviewed with the patient and appropriate educational materials and side effects teaching were provided.   ___________________________________________________________________________________________________________________________________

## 2018-02-19 NOTE — DISCHARGE SUMMARY
Physician Discharge Summary       Patient: Keven Smith MRN: [id number]  SSN: [id number]    YOB: 1995  Age: 20 y.o. Sex: female    PCP: Marcelino Bal MD    Allergies: Review of patient's allergies indicates no known allergies. Admit date: 2/17/2018  Admitting Provider: Shivani Stanton MD    Discharge date: 2/19/2018  Discharging Provider: Shivani Stanton MD    * Admission Diagnoses: Cellulitis  Cellulitis    * Discharge Diagnoses:    Hospital Problems as of 2/19/2018  Never Reviewed          Codes Class Noted - Resolved POA    * (Principal)Cellulitis ICD-10-CM: L03.90  ICD-9-CM: 682.9  2/17/2018 - Present Unknown        Volume depletion ICD-10-CM: E86.9  ICD-9-CM: 276.50  2/17/2018 - Present Unknown        Bandemia ICD-10-CM: D72.825  ICD-9-CM: 288.66  2/17/2018 - Present Unknown              * Hospital Course:     Ms. Naida Nassar is a 21 yo white female who presented 2/17 for left leg redness and erythema and is on vancomycin IV for cellulitis. Has had cellulitis twice before in the leg that improved with iv vancomycin/oral clindamycin. Denies trauma to LEs, animal bites/scratches or any IV use. Endorses chest pressure that is worsened with breathing and is reproducible. Not associated with sob, diaphoresis nor exertion. Improves with NSAIDS, believed to be musculoskeletal in etiology. She is being changed to oral clindamycin to complete a ten day course as she is now afebrile without a leukocytosis. She is medically stable for discharge.      Discharge Exam:    General: awake, alert, oriented  Eyes; non icteric  Neck; supple  CV: RRR  Pulm; CTAB  Abd; soft, non distended, active BS  Ext: left calf with area of warmth/erythema and tenderness with 1+ edema, palpable pedal pulses bilaterally    * Discharge Condition: stable  * Disposition: Home    Discharge Medications:  Current Discharge Medication List      START taking these medications    Details   clindamycin (CLEOCIN) 300 mg capsule Take 1 Cap by mouth three (3) times daily for 8 days. Qty: 24 Cap, Refills: 0             * Follow-up Care/Patient Instructions:   Activity: ad emelyn  Diet: regular    Follow-up Information     Follow up With Details Comments [facility] Jose Ramesh MD Schedule an appointment as soon as possible for a visit on 3/1/2018 [facility] office at 8:20am. [address] [address]  [phone]       On 2/19/2018          35 minutes spent in discharge planning and coordination of care    Signed:  Nayeli Moreau MD  2/19/2018  10:26 AM

## 2018-02-19 NOTE — PROGRESS NOTES
[address] February 19, 2018       RE: Alisa Horton      To Whom It May Concern,    This is to certify that Alisa Horton was hospitalized from February 1-19, 2018. She may return to school on Thursday February 22, 2018. Please feel free to contact my office if you have any questions or concerns. Thank you for your assistance in this matter.  [phone]    Sincerely,  Jeremiah Carranza RN

## 2018-02-22 LAB
BACTERIA SPEC CULT: NORMAL
BACTERIA SPEC CULT: NORMAL
SERVICE CMNT-IMP: NORMAL
SERVICE CMNT-IMP: NORMAL

## 2019-07-29 PROBLEM — E66.01 SEVERE OBESITY (HCC): Status: ACTIVE | Noted: 2019-07-29

## 2019-08-07 ENCOUNTER — HOSPITAL ENCOUNTER (OUTPATIENT)
Dept: SURGERY | Age: 24
Discharge: HOME | End: 2019-08-07
Attending: OBSTETRICS & GYNECOLOGY
Payer: COMMERCIAL

## 2019-08-07 VITALS
HEART RATE: 85 BPM | TEMPERATURE: 97.1 F | BODY MASS INDEX: 36.85 KG/M2 | RESPIRATION RATE: 16 BRPM | OXYGEN SATURATION: 99 % | HEIGHT: 62 IN | SYSTOLIC BLOOD PRESSURE: 116 MMHG | DIASTOLIC BLOOD PRESSURE: 75 MMHG | WEIGHT: 200.25 LBS

## 2019-08-07 LAB — HGB BLD-MCNC: 13.8 G/DL (ref 11.7–15.4)

## 2019-08-07 PROCEDURE — 85018 HEMOGLOBIN: CPT

## 2019-08-07 NOTE — PERIOP NOTES
Patient verified name and     Order for consent NOT found in EHR and unable to match consent with case posting; patient verified. Type 2 surgery, walk in assessment complete. Labs per surgeon: unknown, no orders; Labs per anesthesia protocol: hemoglobin; results 13.8 and within anesthesia guidelines. EKG: none needed per protocol    Hospital approved surgical skin cleanser and instructions given per hospital policy. Patient provided with and instructed on educational handouts including Guide to Surgery, Pain Management, Hand Hygiene, Blood Transfusion Education, and [facility] Brochure. Patient answered medical/surgical history questions at their best of ability. All prior to admission medications documented in [facility]. Original medication prescription bottle none visualized during patient appointment. Patient instructed to hold all vitamins 7 days prior to surgery and NSAIDS 5 days prior to surgery, patient verbalized understanding. Prescription medications to hold: none    Patient instructed to continue previous medications as prescribed prior to surgery and to take the following medications the day of surgery according to anesthesia guidelines with a small sip of water: none. Patient teach back successful and patient demonstrates knowledge of instructions.

## 2019-08-12 ENCOUNTER — ANESTHESIA EVENT (OUTPATIENT)
Dept: SURGERY | Age: 24
End: 2019-08-12
Payer: COMMERCIAL

## 2019-08-13 ENCOUNTER — HOSPITAL ENCOUNTER (OUTPATIENT)
Age: 24
Discharge: HOME | End: 2019-08-14
Attending: OBSTETRICS & GYNECOLOGY | Admitting: OBSTETRICS & GYNECOLOGY
Payer: COMMERCIAL

## 2019-08-13 ENCOUNTER — ANESTHESIA (OUTPATIENT)
Dept: SURGERY | Age: 24
End: 2019-08-13
Payer: COMMERCIAL

## 2019-08-13 DIAGNOSIS — G89.18 POSTOPERATIVE PAIN: Primary | ICD-10-CM

## 2019-08-13 PROBLEM — R10.2 PELVIC PAIN: Status: ACTIVE | Noted: 2019-08-13

## 2019-08-13 LAB — HCG UR QL: NEGATIVE

## 2019-08-13 PROCEDURE — 77030020255 HC SOL INJ LR 1000ML BG: Performed by: OBSTETRICS & GYNECOLOGY

## 2019-08-13 PROCEDURE — 99218 HC RM OBSERVATION: CPT

## 2019-08-13 PROCEDURE — 77030035277 HC OBTRTR BLDELSS DISP INTU -B: Performed by: OBSTETRICS & GYNECOLOGY

## 2019-08-13 PROCEDURE — 77030019940 HC BLNKT HYPOTHRM STRY -B: Performed by: ANESTHESIOLOGY

## 2019-08-13 PROCEDURE — 77030008522 HC TBNG INSUF LAPRO STRY -B: Performed by: OBSTETRICS & GYNECOLOGY

## 2019-08-13 PROCEDURE — 74011250636 HC RX REV CODE- 250/636: Performed by: ANESTHESIOLOGY

## 2019-08-13 PROCEDURE — 77030022704 HC SUT VLOC COVD -B: Performed by: OBSTETRICS & GYNECOLOGY

## 2019-08-13 PROCEDURE — 74011250636 HC RX REV CODE- 250/636: Performed by: OBSTETRICS & GYNECOLOGY

## 2019-08-13 PROCEDURE — 77030014650 HC SEAL MTRX FLOSEL BAXT -C: Performed by: OBSTETRICS & GYNECOLOGY

## 2019-08-13 PROCEDURE — 77030008756 HC TU IRR SUC STRY -B: Performed by: OBSTETRICS & GYNECOLOGY

## 2019-08-13 PROCEDURE — 76210000016 HC OR PH I REC 1 TO 1.5 HR: Performed by: OBSTETRICS & GYNECOLOGY

## 2019-08-13 PROCEDURE — 77030016151 HC PROTCTR LNS DFOG COVD -B: Performed by: OBSTETRICS & GYNECOLOGY

## 2019-08-13 PROCEDURE — 77030010517 HC APPL SEAL FLOSEL BAXT -B: Performed by: OBSTETRICS & GYNECOLOGY

## 2019-08-13 PROCEDURE — 77030008771 HC TU NG SALEM SUMP -A: Performed by: ANESTHESIOLOGY

## 2019-08-13 PROCEDURE — 77030031139 HC SUT VCRL2 J&J -A: Performed by: OBSTETRICS & GYNECOLOGY

## 2019-08-13 PROCEDURE — 77030027138 HC INCENT SPIROMETER -A

## 2019-08-13 PROCEDURE — 76010000875 HC OR TIME 1.5 TO 2HR INTENSV - TIER 2: Performed by: OBSTETRICS & GYNECOLOGY

## 2019-08-13 PROCEDURE — 81025 URINE PREGNANCY TEST: CPT

## 2019-08-13 PROCEDURE — 74011000250 HC RX REV CODE- 250

## 2019-08-13 PROCEDURE — 77030012770 HC TRCR OPT FX AMR -B: Performed by: OBSTETRICS & GYNECOLOGY

## 2019-08-13 PROCEDURE — 77030020703 HC SEAL CANN DISP INTU -B: Performed by: OBSTETRICS & GYNECOLOGY

## 2019-08-13 PROCEDURE — 77030034696 HC CATH URETH FOL 2W BARD -A: Performed by: OBSTETRICS & GYNECOLOGY

## 2019-08-13 PROCEDURE — 74011250636 HC RX REV CODE- 250/636

## 2019-08-13 PROCEDURE — 76060000034 HC ANESTHESIA 1.5 TO 2 HR: Performed by: OBSTETRICS & GYNECOLOGY

## 2019-08-13 PROCEDURE — 77030018846 HC SOL IRR STRL H20 ICUM -A: Performed by: OBSTETRICS & GYNECOLOGY

## 2019-08-13 PROCEDURE — 74011250637 HC RX REV CODE- 250/637: Performed by: ANESTHESIOLOGY

## 2019-08-13 PROCEDURE — 77030032490 HC SLV COMPR SCD KNE COVD -B: Performed by: OBSTETRICS & GYNECOLOGY

## 2019-08-13 PROCEDURE — 77030019908 HC STETH ESOPH SIMS -A: Performed by: ANESTHESIOLOGY

## 2019-08-13 PROCEDURE — 77030010545: Performed by: OBSTETRICS & GYNECOLOGY

## 2019-08-13 PROCEDURE — 77030035279 HC SEAL VSL ENDOWR XI INTU -I2: Performed by: OBSTETRICS & GYNECOLOGY

## 2019-08-13 PROCEDURE — 74011250637 HC RX REV CODE- 250/637: Performed by: OBSTETRICS & GYNECOLOGY

## 2019-08-13 PROCEDURE — 77030037088 HC TUBE ENDOTRACH ORAL NSL COVD-A: Performed by: ANESTHESIOLOGY

## 2019-08-13 PROCEDURE — 88307 TISSUE EXAM BY PATHOLOGIST: CPT

## 2019-08-13 PROCEDURE — 77030039425 HC BLD LARYNG TRULITE DISP TELE -A: Performed by: ANESTHESIOLOGY

## 2019-08-13 PROCEDURE — 94760 N-INVAS EAR/PLS OXIMETRY 1: CPT

## 2019-08-13 PROCEDURE — 77030002933 HC SUT MCRYL J&J -A: Performed by: OBSTETRICS & GYNECOLOGY

## 2019-08-13 PROCEDURE — 77030018778 HC MANIP UTER VCAR CNMD -B: Performed by: OBSTETRICS & GYNECOLOGY

## 2019-08-13 PROCEDURE — 74011000250 HC RX REV CODE- 250: Performed by: OBSTETRICS & GYNECOLOGY

## 2019-08-13 PROCEDURE — 77030003578 HC NDL INSUF VERES AMR -B: Performed by: OBSTETRICS & GYNECOLOGY

## 2019-08-13 RX ORDER — GABAPENTIN 300 MG/1
600 CAPSULE ORAL ONCE
Status: COMPLETED | OUTPATIENT
Start: 2019-08-13 | End: 2019-08-13

## 2019-08-13 RX ORDER — ATROPA BELLADONNA AND OPIUM 16.2; 6 MG/1; MG/1
SUPPOSITORY RECTAL AS NEEDED
Status: DISCONTINUED | OUTPATIENT
Start: 2019-08-13 | End: 2019-08-13 | Stop reason: HOSPADM

## 2019-08-13 RX ORDER — FLUMAZENIL 0.1 MG/ML
0.2 INJECTION INTRAVENOUS AS NEEDED
Status: DISCONTINUED | OUTPATIENT
Start: 2019-08-13 | End: 2019-08-13 | Stop reason: HOSPADM

## 2019-08-13 RX ORDER — LIDOCAINE HYDROCHLORIDE 10 MG/ML
0.1 INJECTION INFILTRATION; PERINEURAL AS NEEDED
Status: DISCONTINUED | OUTPATIENT
Start: 2019-08-13 | End: 2019-08-13 | Stop reason: HOSPADM

## 2019-08-13 RX ORDER — LIDOCAINE HYDROCHLORIDE 20 MG/ML
INJECTION, SOLUTION EPIDURAL; INFILTRATION; INTRACAUDAL; PERINEURAL AS NEEDED
Status: DISCONTINUED | OUTPATIENT
Start: 2019-08-13 | End: 2019-08-13 | Stop reason: HOSPADM

## 2019-08-13 RX ORDER — OXYCODONE AND ACETAMINOPHEN 5; 325 MG/1; MG/1
1 TABLET ORAL
Status: DISCONTINUED | OUTPATIENT
Start: 2019-08-13 | End: 2019-08-14 | Stop reason: HOSPADM

## 2019-08-13 RX ORDER — NEOSTIGMINE METHYLSULFATE 1 MG/ML
INJECTION INTRAVENOUS AS NEEDED
Status: DISCONTINUED | OUTPATIENT
Start: 2019-08-13 | End: 2019-08-13 | Stop reason: HOSPADM

## 2019-08-13 RX ORDER — ACETAMINOPHEN 10 MG/ML
INJECTION, SOLUTION INTRAVENOUS AS NEEDED
Status: DISCONTINUED | OUTPATIENT
Start: 2019-08-13 | End: 2019-08-13 | Stop reason: HOSPADM

## 2019-08-13 RX ORDER — NALOXONE HYDROCHLORIDE 0.4 MG/ML
0.4 INJECTION, SOLUTION INTRAMUSCULAR; INTRAVENOUS; SUBCUTANEOUS AS NEEDED
Status: DISCONTINUED | OUTPATIENT
Start: 2019-08-13 | End: 2019-08-14 | Stop reason: HOSPADM

## 2019-08-13 RX ORDER — SODIUM CHLORIDE 0.9 % (FLUSH) 0.9 %
5-40 SYRINGE (ML) INJECTION EVERY 8 HOURS
Status: DISCONTINUED | OUTPATIENT
Start: 2019-08-13 | End: 2019-08-14 | Stop reason: HOSPADM

## 2019-08-13 RX ORDER — DEXAMETHASONE SODIUM PHOSPHATE 4 MG/ML
INJECTION, SOLUTION INTRA-ARTICULAR; INTRALESIONAL; INTRAMUSCULAR; INTRAVENOUS; SOFT TISSUE AS NEEDED
Status: DISCONTINUED | OUTPATIENT
Start: 2019-08-13 | End: 2019-08-13 | Stop reason: HOSPADM

## 2019-08-13 RX ORDER — DOCUSATE SODIUM 100 MG/1
100 CAPSULE, LIQUID FILLED ORAL 2 TIMES DAILY
Status: DISCONTINUED | OUTPATIENT
Start: 2019-08-13 | End: 2019-08-14 | Stop reason: HOSPADM

## 2019-08-13 RX ORDER — OXYCODONE HYDROCHLORIDE 5 MG/1
5 TABLET ORAL
Status: DISCONTINUED | OUTPATIENT
Start: 2019-08-13 | End: 2019-08-13 | Stop reason: HOSPADM

## 2019-08-13 RX ORDER — KETOROLAC TROMETHAMINE 30 MG/ML
30 INJECTION, SOLUTION INTRAMUSCULAR; INTRAVENOUS
Status: DISPENSED | OUTPATIENT
Start: 2019-08-13 | End: 2019-08-14

## 2019-08-13 RX ORDER — NALOXONE HYDROCHLORIDE 0.4 MG/ML
0.1 INJECTION, SOLUTION INTRAMUSCULAR; INTRAVENOUS; SUBCUTANEOUS
Status: DISCONTINUED | OUTPATIENT
Start: 2019-08-13 | End: 2019-08-13 | Stop reason: HOSPADM

## 2019-08-13 RX ORDER — OXYCODONE HYDROCHLORIDE 5 MG/1
10 TABLET ORAL
Status: DISCONTINUED | OUTPATIENT
Start: 2019-08-13 | End: 2019-08-13 | Stop reason: HOSPADM

## 2019-08-13 RX ORDER — SODIUM CHLORIDE 0.9 % (FLUSH) 0.9 %
5-40 SYRINGE (ML) INJECTION AS NEEDED
Status: DISCONTINUED | OUTPATIENT
Start: 2019-08-13 | End: 2019-08-14 | Stop reason: HOSPADM

## 2019-08-13 RX ORDER — MIDAZOLAM HYDROCHLORIDE 1 MG/ML
2 INJECTION, SOLUTION INTRAMUSCULAR; INTRAVENOUS
Status: COMPLETED | OUTPATIENT
Start: 2019-08-13 | End: 2019-08-13

## 2019-08-13 RX ORDER — PROPOFOL 10 MG/ML
INJECTION, EMULSION INTRAVENOUS AS NEEDED
Status: DISCONTINUED | OUTPATIENT
Start: 2019-08-13 | End: 2019-08-13 | Stop reason: HOSPADM

## 2019-08-13 RX ORDER — GLYCOPYRROLATE 0.2 MG/ML
INJECTION INTRAMUSCULAR; INTRAVENOUS AS NEEDED
Status: DISCONTINUED | OUTPATIENT
Start: 2019-08-13 | End: 2019-08-13 | Stop reason: HOSPADM

## 2019-08-13 RX ORDER — BUPIVACAINE HYDROCHLORIDE 5 MG/ML
INJECTION, SOLUTION EPIDURAL; INTRACAUDAL AS NEEDED
Status: DISCONTINUED | OUTPATIENT
Start: 2019-08-13 | End: 2019-08-13 | Stop reason: HOSPADM

## 2019-08-13 RX ORDER — DIPHENHYDRAMINE HYDROCHLORIDE 50 MG/ML
12.5 INJECTION, SOLUTION INTRAMUSCULAR; INTRAVENOUS
Status: DISCONTINUED | OUTPATIENT
Start: 2019-08-13 | End: 2019-08-13 | Stop reason: HOSPADM

## 2019-08-13 RX ORDER — FENTANYL CITRATE 50 UG/ML
INJECTION, SOLUTION INTRAMUSCULAR; INTRAVENOUS AS NEEDED
Status: DISCONTINUED | OUTPATIENT
Start: 2019-08-13 | End: 2019-08-13 | Stop reason: HOSPADM

## 2019-08-13 RX ORDER — IBUPROFEN 200 MG
1 TABLET ORAL EVERY 24 HOURS
Status: DISCONTINUED | OUTPATIENT
Start: 2019-08-13 | End: 2019-08-14 | Stop reason: HOSPADM

## 2019-08-13 RX ORDER — ROCURONIUM BROMIDE 10 MG/ML
INJECTION, SOLUTION INTRAVENOUS AS NEEDED
Status: DISCONTINUED | OUTPATIENT
Start: 2019-08-13 | End: 2019-08-13 | Stop reason: HOSPADM

## 2019-08-13 RX ORDER — CEFAZOLIN SODIUM/WATER 2 G/20 ML
2 SYRINGE (ML) INTRAVENOUS ONCE
Status: COMPLETED | OUTPATIENT
Start: 2019-08-13 | End: 2019-08-13

## 2019-08-13 RX ORDER — DIPHENHYDRAMINE HYDROCHLORIDE 50 MG/ML
12.5 INJECTION, SOLUTION INTRAMUSCULAR; INTRAVENOUS
Status: DISCONTINUED | OUTPATIENT
Start: 2019-08-13 | End: 2019-08-14 | Stop reason: HOSPADM

## 2019-08-13 RX ORDER — SODIUM CHLORIDE, SODIUM LACTATE, POTASSIUM CHLORIDE, CALCIUM CHLORIDE 600; 310; 30; 20 MG/100ML; MG/100ML; MG/100ML; MG/100ML
75 INJECTION, SOLUTION INTRAVENOUS CONTINUOUS
Status: DISCONTINUED | OUTPATIENT
Start: 2019-08-13 | End: 2019-08-13 | Stop reason: HOSPADM

## 2019-08-13 RX ORDER — HYDROMORPHONE HYDROCHLORIDE 2 MG/ML
0.5 INJECTION, SOLUTION INTRAMUSCULAR; INTRAVENOUS; SUBCUTANEOUS
Status: DISCONTINUED | OUTPATIENT
Start: 2019-08-13 | End: 2019-08-13 | Stop reason: HOSPADM

## 2019-08-13 RX ORDER — LORAZEPAM 1 MG/1
1 TABLET ORAL
Status: DISCONTINUED | OUTPATIENT
Start: 2019-08-13 | End: 2019-08-14 | Stop reason: HOSPADM

## 2019-08-13 RX ORDER — HYDROMORPHONE HYDROCHLORIDE 2 MG/ML
1 INJECTION, SOLUTION INTRAMUSCULAR; INTRAVENOUS; SUBCUTANEOUS
Status: DISCONTINUED | OUTPATIENT
Start: 2019-08-13 | End: 2019-08-14 | Stop reason: HOSPADM

## 2019-08-13 RX ORDER — ONDANSETRON 2 MG/ML
INJECTION INTRAMUSCULAR; INTRAVENOUS AS NEEDED
Status: DISCONTINUED | OUTPATIENT
Start: 2019-08-13 | End: 2019-08-13 | Stop reason: HOSPADM

## 2019-08-13 RX ADMIN — MIDAZOLAM 2 MG: 1 INJECTION INTRAMUSCULAR; INTRAVENOUS at 10:05

## 2019-08-13 RX ADMIN — NEOSTIGMINE METHYLSULFATE 3 MG: 1 INJECTION INTRAVENOUS at 11:52

## 2019-08-13 RX ADMIN — FENTANYL CITRATE 100 MCG: 50 INJECTION, SOLUTION INTRAMUSCULAR; INTRAVENOUS at 10:45

## 2019-08-13 RX ADMIN — OXYCODONE HYDROCHLORIDE AND ACETAMINOPHEN 1 TABLET: 5; 325 TABLET ORAL at 17:33

## 2019-08-13 RX ADMIN — HYDROMORPHONE HYDROCHLORIDE 1 MG: 2 INJECTION INTRAMUSCULAR; INTRAVENOUS; SUBCUTANEOUS at 23:00

## 2019-08-13 RX ADMIN — ACETAMINOPHEN 1000 MG: 10 INJECTION, SOLUTION INTRAVENOUS at 11:41

## 2019-08-13 RX ADMIN — FENTANYL CITRATE 50 MCG: 50 INJECTION, SOLUTION INTRAMUSCULAR; INTRAVENOUS at 11:06

## 2019-08-13 RX ADMIN — FENTANYL CITRATE 50 MCG: 50 INJECTION, SOLUTION INTRAMUSCULAR; INTRAVENOUS at 11:10

## 2019-08-13 RX ADMIN — SODIUM CHLORIDE, SODIUM LACTATE, POTASSIUM CHLORIDE, AND CALCIUM CHLORIDE: 600; 310; 30; 20 INJECTION, SOLUTION INTRAVENOUS at 11:31

## 2019-08-13 RX ADMIN — GLYCOPYRROLATE 0.4 MG: 0.2 INJECTION INTRAMUSCULAR; INTRAVENOUS at 11:52

## 2019-08-13 RX ADMIN — SODIUM CHLORIDE, SODIUM LACTATE, POTASSIUM CHLORIDE, AND CALCIUM CHLORIDE 75 ML/HR: 600; 310; 30; 20 INJECTION, SOLUTION INTRAVENOUS at 09:34

## 2019-08-13 RX ADMIN — HYDROMORPHONE HYDROCHLORIDE 0.5 MG: 2 INJECTION INTRAMUSCULAR; INTRAVENOUS; SUBCUTANEOUS at 12:25

## 2019-08-13 RX ADMIN — DOCUSATE SODIUM 100 MG: 100 CAPSULE, LIQUID FILLED ORAL at 17:27

## 2019-08-13 RX ADMIN — KETOROLAC TROMETHAMINE 30 MG: 30 INJECTION, SOLUTION INTRAMUSCULAR at 13:58

## 2019-08-13 RX ADMIN — Medication 2 G: at 10:40

## 2019-08-13 RX ADMIN — LIDOCAINE HYDROCHLORIDE 80 MG: 20 INJECTION, SOLUTION EPIDURAL; INFILTRATION; INTRACAUDAL; PERINEURAL at 10:45

## 2019-08-13 RX ADMIN — PROPOFOL 200 MG: 10 INJECTION, EMULSION INTRAVENOUS at 10:45

## 2019-08-13 RX ADMIN — FENTANYL CITRATE 50 MCG: 50 INJECTION, SOLUTION INTRAMUSCULAR; INTRAVENOUS at 12:06

## 2019-08-13 RX ADMIN — LIDOCAINE HYDROCHLORIDE 0.1 ML: 10 INJECTION, SOLUTION INFILTRATION; PERINEURAL at 09:49

## 2019-08-13 RX ADMIN — HYDROMORPHONE HYDROCHLORIDE 0.5 MG: 2 INJECTION INTRAMUSCULAR; INTRAVENOUS; SUBCUTANEOUS at 12:20

## 2019-08-13 RX ADMIN — GABAPENTIN 600 MG: 300 CAPSULE ORAL at 09:35

## 2019-08-13 RX ADMIN — DEXAMETHASONE SODIUM PHOSPHATE 10 MG: 4 INJECTION, SOLUTION INTRA-ARTICULAR; INTRALESIONAL; INTRAMUSCULAR; INTRAVENOUS; SOFT TISSUE at 11:08

## 2019-08-13 RX ADMIN — ROCURONIUM BROMIDE 50 MG: 10 INJECTION, SOLUTION INTRAVENOUS at 10:45

## 2019-08-13 RX ADMIN — ONDANSETRON 4 MG: 2 INJECTION INTRAMUSCULAR; INTRAVENOUS at 11:43

## 2019-08-13 NOTE — ANESTHESIA PREPROCEDURE EVALUATION
Relevant Problems   No relevant active problems       Anesthetic History   No history of anesthetic complications            Review of Systems / Medical History  Patient summary reviewed and pertinent labs reviewed    Pulmonary          Smoker (Current)  Asthma : well controlled       Neuro/Psych         Psychiatric history (Depression)     Cardiovascular                  Exercise tolerance: >4 METS  Comments: Denies CV history   GI/Hepatic/Renal  Within defined limits              Endo/Other        Obesity     Other Findings              Physical Exam    Airway  Mallampati: II  TM Distance: 4 - 6 cm  Neck ROM: normal range of motion   Mouth opening: Normal     Cardiovascular    Rhythm: regular  Rate: normal         Dental    Dentition: Caps/crowns  Comments: Missing some teeth   Pulmonary  Breath sounds clear to auscultation               Abdominal  GI exam deferred       Other Findings            Anesthetic Plan    ASA: 2  Anesthesia type: general          Induction: Intravenous  Anesthetic plan and risks discussed with: Patient and Mother

## 2019-08-13 NOTE — ANESTHESIA POSTPROCEDURE EVALUATION
Procedure(s):  ROBOTIC ASSISTED HYSTERECTOMY WITH BILATERAL SALPINGECTOMY. general    Anesthesia Post Evaluation      Multimodal analgesia: multimodal analgesia used between 6 hours prior to anesthesia start to PACU discharge  Patient location during evaluation: bedside  Patient participation: complete - patient participated  Level of consciousness: awake and alert  Pain score: 1  Pain management: adequate  Airway patency: patent  Anesthetic complications: no  Cardiovascular status: acceptable  Respiratory status: acceptable  Hydration status: acceptable  Comments: Patient doing well. Continue care on floor.    Post anesthesia nausea and vomiting:  none      Vitals Value Taken Time   /70 8/13/2019  1:05 PM   Temp 36.7 °C (98.1 °F) 8/13/2019 12:10 PM   Pulse 69 8/13/2019  1:05 PM   Resp 16 8/13/2019  1:05 PM   SpO2 96 % 8/13/2019  1:05 PM

## 2019-08-13 NOTE — PROGRESS NOTES
TRANSFER - IN REPORT:    Verbal report received from Flavio(name) on [facility]  being received from PACU(unit) for routine post - op      Report consisted of patients Situation, Background, Assessment and   Recommendations(SBAR). Information from the following report(s) SBAR, Kardex, OR Summary, Procedure Summary, Intake/Output and MAR was reviewed with the receiving nurse. Opportunity for questions and clarification was provided. Assessment completed upon patients arrival to unit and care assumed.

## 2019-08-13 NOTE — PERIOP NOTES
TRANSFER - OUT REPORT:    Verbal report given to Jose Armando Bray RN on Tiffanie  being transferred to [facility] for routine post - op       Report consisted of patients Situation, Background, Assessment and   Recommendations(SBAR). Information from the following report(s) OR Summary, Procedure Summary, Intake/Output and MAR was reviewed with the receiving nurse. Opportunity for questions and clarification was provided.       Patient transported with:   O2 @ 1 liters

## 2019-08-13 NOTE — PROGRESS NOTES
Admission assessment complete. Patient is alert and oriented x4. Abdomen soft and tender 4 incision sites on abdomen with 2x2 with tegaderm in place. Kanchan-pad in place with no drainage noted. IV is capped. Ambulated with assistance to bathroom voided yellow clear urine. Call light in place. Bed is low and locked. Instructed to call for assistance. Verbalizes understanding. No needs at this time.

## 2019-08-13 NOTE — OP NOTES
NAME: Carey Chery    DATE OF SURGERY: 8/13/2019    Pre-Op Diagnosis: Uterine prolapse [N81.4]  Pelvic pain in female [R10.2]  Pelvic adhesions [N73.6]  Dyspareunia due to medical condition in female [N94.19]    Post-Op Diagnosis: Uterine prolapse [N81.4]  Pelvic pain in female [R10.2]      Procedure: Procedure(s):  ROBOTIC ASSISTED HYSTERECTOMY WITH BILATERAL SALPINGECTOMY    Surgeon: Robel Suárez MD    Anesthesia: General     Estimated Blood Loss: 50cc    Specimens:   ID Type Source Tests Collected by Time Destination   1 : uterus and bilateral fallopian tubes Fresh Uterus with Bilateral Fallopian Tubes  Walter Gomez MD 8/13/2019 1138 Pathology        Complications: none     DRAINS: Galvez catheter    IMPLANTS: None    FINDINGS: boggy uterus with adhesions    DESCRIPTION: A time out was performed verifying patient, surgery, surgical site and surgical team. After an adequate level of anesthesia was obtained, the patient was prepped and draped in the usual sterile fashion in the dorsal lithotomy position. A weighted speculum was placed in the anterior aspect and the cervix was grasped with a tenaculum. The uterus was sounded to a depth of 9 cm. The V care was placed and cup was sutured to the cervix. The infraumbilical incision was made and the Veress  needle inserted insufflating the peritoneal cavity with CO2. The 12  mm trocar was placed. Under visualization  lateral ports were placed on both sides. The robot was then attached to the trocars. After instruments were placed under visualization and went to the console   The right and then left  Utero-ovarian  ligament was coagulated with PK and cut. The right and then left broad ligament was coagulated and cut. The bladder flap was  developed on the right side and furthered on the left. The round ligament was coagulated and cut bilaterally . The bladder flap was continued to be developed.  The uterine vessels were then skeletonized and coagulated and cut.   The cardinal ligaments were taken down to the Vcare ring and this was circumscribed anteriorly and posteriorly. Hemostasis was noted. The uterus and cervix and fallopian tubes were removed through the vagina. The vaginal cuff was closed with 0 Vlock securing uterosacral ligaments bilaterally then closed  in a running fashion and closed midline. The area was irrigated free of blood clot and debris. Hemostasis was noted at all points. Floseal placed . The robot was detached. I again scrubbed, removed the trocars and closed each of the port sites with 4-0 vicryl subcuticularly. The patient tolerated the procedure well and went to the recovery room in good condition.           Nilson Lamb MD

## 2019-08-13 NOTE — H&P
Subjective:     Patient is a 21 y.o.  female presents with pelvic pain. gradually worsening course. Patient Active Problem List    Diagnosis Date Noted    Severe obesity ([address] 2019    Cellulitis 2018    Volume depletion 2018    Bandemia 2018     Past Medical History:   Diagnosis Date    Asthma     as a child; has outgrown    Depression     history of ; no longer on meds    Morbid obesity ([address]     Pelvic floor dysfunction in female     Sepsis [facility] to [facility]ul[facility] [address]     left lower extremity    Solitary kidney     born with right kidney only    Tobacco abuse     trying to [place]      Past Surgical History:   Procedure Laterality Date    HX  SECTION      1x    HX WISDOM TEETH EXTRACTION        [unfilled]  No Known Allergies   Social History     Tobacco Use    Smoking status: Current Every Day Smoker     Packs/day: 0.50     Years: 7.00     Pack years: 3.50    Smokeless tobacco: Never Used   Substance Use Topics    Alcohol use: Never     Frequency: Never      Family History   Problem Relation Age of Onset    Ovarian Cancer Mother     Depression Mother       Review of Systems  A comprehensive review of systems was negative except for that written in the HPI. Objective:     Patient Vitals for the past 8 hrs:   BP Temp Pulse Resp SpO2 Weight   19 0859 125/79 98.1 °F (36.7 °C) 78 20 99 % 202 lb 6 oz (91.8 kg)     No intake or output data in the 24 hours ending 19 0952      General: Alert . Oriented x3   HEENT: Normocephalic PEERL   Heart: RRR   Lungs: Clear   Abdominal: Bowel sounds are normal, liver is not enlarged, spleen is not enlarged   Neurological: Alert and oriented X 3, normal strength and tone. Normal symmetric reflexes.  Normal coordination and gait   Extremities: extremities normal, atraumatic, no cyanosis or edema     Assessment:     Patient Active Problem List    Diagnosis Date Noted    Severe obesity ([address] 2019    Cellulitis 02/17/2018    Volume depletion 02/17/2018    Bandemia 02/17/2018         Pelvic pain    Plan:       Procedure(s):  ROBOTIC ASSISTED HYSTERECTOMY WITH BILATERAL SALPINGECTOMY          The procedure was reviewed in detail as well as the risks of bleeding, infection, DVT and potential surgical complications involving the bladder, ureters, colon or intestines. Also the alternatives,  benefits, recovery and follow-up. All of her questions were answered.

## 2019-08-14 VITALS
RESPIRATION RATE: 16 BRPM | OXYGEN SATURATION: 95 % | BODY MASS INDEX: 37.01 KG/M2 | SYSTOLIC BLOOD PRESSURE: 106 MMHG | TEMPERATURE: 97.7 F | HEART RATE: 92 BPM | DIASTOLIC BLOOD PRESSURE: 55 MMHG | WEIGHT: 202.38 LBS

## 2019-08-14 PROCEDURE — 99218 HC RM OBSERVATION: CPT

## 2019-08-14 PROCEDURE — 74011250637 HC RX REV CODE- 250/637: Performed by: OBSTETRICS & GYNECOLOGY

## 2019-08-14 PROCEDURE — 74011250636 HC RX REV CODE- 250/636: Performed by: OBSTETRICS & GYNECOLOGY

## 2019-08-14 RX ORDER — OXYCODONE AND ACETAMINOPHEN 5; 325 MG/1; MG/1
1 TABLET ORAL
Qty: 12 TAB | Refills: 0 | Status: SHIPPED | OUTPATIENT
Start: 2019-08-14 | End: 2019-08-17

## 2019-08-14 RX ADMIN — OXYCODONE HYDROCHLORIDE AND ACETAMINOPHEN 1 TABLET: 5; 325 TABLET ORAL at 11:20

## 2019-08-14 RX ADMIN — HYDROMORPHONE HYDROCHLORIDE 1 MG: 2 INJECTION INTRAMUSCULAR; INTRAVENOUS; SUBCUTANEOUS at 06:29

## 2019-08-14 RX ADMIN — DOCUSATE SODIUM 100 MG: 100 CAPSULE, LIQUID FILLED ORAL at 08:04

## 2019-08-14 RX ADMIN — OXYCODONE HYDROCHLORIDE AND ACETAMINOPHEN 1 TABLET: 5; 325 TABLET ORAL at 08:04

## 2019-08-14 NOTE — PROGRESS NOTES
Discharge instructions were reviewed with the patient. Opportunity for questions given. Patient verbalized understanding of discharge and follow up instructions, as well as S/S to report to MD or return to ER for. PIV was removed. Prescriptions provided. Patient will D/C to home. Pt states no ride until after 1400.

## 2019-08-14 NOTE — PROGRESS NOTES
Shift Assessment Pt post op from Lap  Hysterectomy.  Pt A&O x4, resting quietly in bed.  Neuro WDL.   Abdominal dressing clean, dry and intact. SCD's on.  No needs at present.  Bed low and locked, side rails x3.

## 2019-08-14 NOTE — DISCHARGE INSTRUCTIONS
DISCHARGE SUMMARY from Nurse    PATIENT INSTRUCTIONS:    After general anesthesia or intravenous sedation, for 24 hours or while taking prescription Narcotics:  · Limit your activities  · Do not drive and operate hazardous machinery  · Do not make important personal or business decisions  · Do  not drink alcoholic beverages  · If you have not urinated within 8 hours after discharge, please contact your surgeon on call. Report the following to your surgeon:  · Excessive pain, swelling, redness or odor of or around the surgical area  · Temperature over 100.5  · Nausea and vomiting lasting longer than 4 hours or if unable to take medications  · Any signs of decreased circulation or nerve impairment to extremity: change in color, persistent  numbness, tingling, coldness or increase pain  · Any questions    What to do at Home:  Recommended activity: Activity as tolerated, take short frequent walks several times a day to prevent blood clots, no strenuous exercise or heavy lifting; no sex, douching or tampons; no tub baths or swimming, may shower, keep incisions clean and dry. Regular diet, Colace or Miralax OTC for constipation. If you experience any of the following symptoms severe abdominal pain, nausea/vomiting lasting longer than 4 hours, fever (>101) or other s/s of wound infection, heavy vaginal bleeding (>1 rod pad/hour), please follow up with your surgeon. *  Please give a list of your current medications to your Primary Care Provider. *  Please update this list whenever your medications are discontinued, doses are      changed, or new medications (including over-the-counter products) are added. *  Please carry medication information at all times in case of emergency situations.     These are general instructions for a healthy lifestyle:    No smoking/ No tobacco products/ Avoid exposure to second hand smoke  Surgeon General's Warning:  Quitting smoking now greatly reduces serious risk to your health. Obesity, smoking, and sedentary lifestyle greatly increases your risk for illness    A healthy diet, regular physical exercise & weight monitoring are important for maintaining a healthy lifestyle    You may be retaining fluid if you have a history of heart failure or if you experience any of the following symptoms:  Weight gain of 3 pounds or more overnight or 5 pounds in a week, increased swelling in our hands or feet or shortness of breath while lying flat in bed. Please call your doctor as soon as you notice any of these symptoms; do not wait until your next office visit. Recognize signs and symptoms of STROKE:    F-face looks uneven    A-arms unable to move or move unevenly    S-speech slurred or non-existent    T-time-call 911 as soon as signs and symptoms begin-DO NOT go       Back to bed or wait to see if you get better-TIME IS BRAIN. Warning Signs of HEART ATTACK     Call 911 if you have these symptoms:   Chest discomfort. Most heart attacks involve discomfort in the center of the chest that lasts more than a few minutes, or that goes away and comes back. It can feel like uncomfortable pressure, squeezing, fullness, or pain.  Discomfort in other areas of the upper body. Symptoms can include pain or discomfort in one or both arms, the back, neck, jaw, or stomach.  Shortness of breath with or without chest discomfort.  Other signs may include breaking out in a cold sweat, nausea, or lightheadedness. Don't wait more than five minutes to call 911 - MINUTES MATTER! Fast action can save your life. Calling 911 is almost always the fastest way to get lifesaving treatment. Emergency Medical Services staff can begin treatment when they arrive -- up to an hour sooner than if someone gets to the hospital by car. The discharge information has been reviewed with the patient. The patient verbalized understanding.   Discharge medications reviewed with the patient and appropriate educational materials and side effects teaching were provided. ___________________________________________________________________________________________________________________________________    Patient Education        Laparoscopic Hysterectomy: What to Expect at Home  Your Recovery    A hysterectomy is surgery to take out the uterus. In some cases, the ovaries and fallopian tubes also are taken out at the same time. You can expect to feel better and stronger each day, but you may need pain medicine for a week or two. You may get tired easily or have less energy than usual. The tiredness may last for several weeks after surgery. You will probably notice that your belly is swollen and puffy. This is common. The swelling will take several weeks to go down. You may take about 4 to 6 weeks to fully recover. It is important to avoid lifting while you are recovering so that you can heal.  This care sheet gives you a general idea about how long it will take for you to recover. But each person recovers at a different pace. Follow the steps below to get better as quickly as possible. How can you care for yourself at home? Activity    · Rest when you feel tired.     · Be active. Walking is a good choice.     · Allow the area to heal. Don't move quickly or lift anything heavy until you are feeling better.     · You may shower 24 to 48 hours after surgery, if your doctor okays it. Pat the incision dry. Do not take a bath for the first 2 weeks, or until your doctor tells you it is okay.     · Ask your doctor when it is okay for you to have sex. Diet    · You can eat your normal diet. If your stomach is upset, try bland, low-fat foods like plain rice, broiled chicken, toast, and yogurt.     · If your bowel movements are not regular right after surgery, try to avoid constipation and straining. Drink plenty of water. Your doctor may suggest fiber, a stool softener, or a mild laxative.    Medicines    · Your doctor will tell you if and when you can restart your medicines. He or she will also give you instructions about taking any new medicines.     · If you take blood thinners, such as warfarin (Coumadin), clopidogrel (Plavix), or aspirin, be sure to talk to your doctor. He or she will tell you if and when to start taking those medicines again. Make sure that you understand exactly what your doctor wants you to do.     · Be safe with medicines. Read and follow all instructions on the label. ? If the doctor gave you a prescription medicine for pain, take it as prescribed. ? If you are not taking a prescription pain medicine, ask your doctor if you can take an over-the-counter medicine.     · If your doctor prescribed antibiotics, take them as directed. Do not stop taking them just because you feel better. You need to take the full course of antibiotics. Incision care    · You may have stitches over the cuts (incisions) the doctor made in your belly.     · If you have strips of tape on the cut (incision) the doctor made, leave the tape on for a week or until it falls off.     · Wash the area daily with warm, soapy water, and pat it dry. Don't use hydrogen peroxide or alcohol. They can slow healing.     · You may cover the area with a gauze bandage if it oozes fluid or rubs against clothing.     · Change the bandage every day. Other instructions    · You may have some light vaginal bleeding. Wear sanitary pads if needed. Do not douche or use tampons.     · Don't have sex until the doctor says it is okay. Follow-up care is a key part of your treatment and safety. Be sure to make and go to all appointments, and call your doctor if you are having problems. It's also a good idea to know your test results and keep a list of the medicines you take. When should you call for help? Call 911 anytime you think you may need emergency care.  For example, call if:    · You passed out (lost consciousness).     · You have chest pain, are short of breath, or cough up blood.    Call your doctor now or seek immediate medical care if:    · You have pain that does not get better after you take pain medicine.     · You cannot pass stools or gas.     · You have vaginal discharge that has increased in amount or smells bad.     · You are sick to your stomach or cannot drink fluids.     · You have loose stitches, or your incision comes open.     · Bright red blood has soaked through the bandage over your incision.     · You have signs of infection, such as:  ? Increased pain, swelling, warmth, or redness. ? Red streaks leading from the incision. ? Pus draining from the incision. ? A fever.     · You have bright red vaginal bleeding that soaks one or more pads in an hour, or you have large clots.     · You have signs of a blood clot in your leg (called a deep vein thrombosis), such as:  ? Pain in your calf, back of the knee, thigh, or groin. ? Redness and swelling in your leg or groin.    Watch closely for changes in your health, and be sure to contact your doctor if you have any problems. Where can you learn more? Go to http://sylvie-chela.info/. Enter Q131 in the search box to learn more about \"Laparoscopic Hysterectomy: What to Expect at Home. \"  Current as of: February 19, 2019  Content Version: 12.1  © 2938-6814 [employer]. Care instructions adapted under license by [employer] (which disclaims liability or warranty for this information). If you have questions about a medical condition or this instruction, always ask your healthcare professional. [place] [address] any warranty or liability for your use of this information.

## 2019-08-14 NOTE — PROGRESS NOTES
Pt seen post op day one, no anesthesia complications noted, pt seems pleased with anesthesia services.

## 2019-08-14 NOTE — PROGRESS NOTES
Pt alert and oriented. Resting in bed, using cell phone. Pt reported she had just recently woken from nap, c/o pain 7.5/10. IV dilaudid given and IV was flushed. Pt able to dorsi/plantar flex. +2 bilateral radial pulses. Active bowel sounds noted. x4 abd lap sites covered by 2x2 and transparent film. All clean dry and intact. Popsicle and can of av mist given to pt per request. IS, call light within reach. Continue to monitor.

## 2021-08-24 ENCOUNTER — APPOINTMENT (OUTPATIENT)
Dept: MRI IMAGING | Age: 26
DRG: 347 | End: 2021-08-24
Attending: HOSPITALIST
Payer: MEDICAID

## 2021-08-24 ENCOUNTER — HOSPITAL ENCOUNTER (INPATIENT)
Age: 26
LOS: 3 days | Discharge: HOME | DRG: 347 | End: 2021-08-27
Attending: HOSPITALIST
Payer: MEDICAID

## 2021-08-24 PROBLEM — G40.909 SEIZURE DISORDER (HCC): Status: ACTIVE | Noted: 2021-08-24

## 2021-08-24 PROBLEM — G35 MULTIPLE SCLEROSIS (HCC): Status: ACTIVE | Noted: 2021-08-24

## 2021-08-24 LAB
ALBUMIN SERPL-MCNC: 3.7 G/DL (ref 3.5–5.2)
ALP BLD-CCNC: 81 U/L (ref 35–104)
ALT SERPL-CCNC: 19 U/L (ref 5–33)
ANION GAP SERPL CALCULATED.3IONS-SCNC: 14 MMOL/L (ref 7–19)
AST SERPL-CCNC: 24 U/L (ref 5–32)
BACTERIA: NEGATIVE /HPF
BILIRUB SERPL-MCNC: 0.5 MG/DL (ref 0.2–1.2)
BILIRUBIN URINE: NEGATIVE
BLOOD, URINE: ABNORMAL
BUN BLDV-MCNC: 16 MG/DL (ref 6–20)
C-REACTIVE PROTEIN: <0.3 MG/DL (ref 0–0.5)
CALCIUM SERPL-MCNC: 8.3 MG/DL (ref 8.6–10)
CHLORIDE BLD-SCNC: 102 MMOL/L (ref 98–111)
CLARITY: CLEAR
CO2: 21 MMOL/L (ref 22–29)
COLOR: YELLOW
CREAT SERPL-MCNC: 1.2 MG/DL (ref 0.5–0.9)
CRYSTALS, UA: ABNORMAL /HPF
EPITHELIAL CELLS, UA: 0 /HPF (ref 0–5)
GFR AFRICAN AMERICAN: >59
GFR NON-AFRICAN AMERICAN: 54
GLUCOSE BLD-MCNC: 152 MG/DL (ref 74–109)
GLUCOSE URINE: NEGATIVE MG/DL
HCT VFR BLD CALC: 35 % (ref 37–47)
HEMOGLOBIN: 11.6 G/DL (ref 12–16)
HYALINE CASTS: 4 /HPF (ref 0–8)
KETONES, URINE: NEGATIVE MG/DL
LACTIC ACID: 1.2 MMOL/L (ref 0.5–1.9)
LEUKOCYTE ESTERASE, URINE: ABNORMAL
MCH RBC QN AUTO: 31.6 PG (ref 27–31)
MCHC RBC AUTO-ENTMCNC: 33.1 G/DL (ref 33–37)
MCV RBC AUTO: 95.4 FL (ref 81–99)
NITRITE, URINE: NEGATIVE
PDW BLD-RTO: 13.2 % (ref 11.5–14.5)
PH UA: 6 (ref 5–8)
PLATELET # BLD: 300 K/UL (ref 130–400)
PMV BLD AUTO: 8.9 FL (ref 9.4–12.3)
POTASSIUM SERPL-SCNC: 3.8 MMOL/L (ref 3.5–5)
PROTEIN UA: NEGATIVE MG/DL
RBC # BLD: 3.67 M/UL (ref 4.2–5.4)
RBC UA: 7 /HPF (ref 0–4)
RHEUMATOID FACTOR: 11 IU/ML
SEDIMENTATION RATE, ERYTHROCYTE: 14 MM/HR (ref 0–20)
SODIUM BLD-SCNC: 137 MMOL/L (ref 136–145)
SPECIFIC GRAVITY UA: 1.02 (ref 1–1.03)
TOTAL CK: 74 U/L (ref 26–192)
TOTAL PROTEIN: 6.1 G/DL (ref 6.6–8.7)
TSH REFLEX FT4: 0.77 UIU/ML (ref 0.35–5.5)
UROBILINOGEN, URINE: 0.2 E.U./DL
VITAMIN B-12: 344 PG/ML (ref 211–946)
WBC # BLD: 7.3 K/UL (ref 4.8–10.8)
WBC UA: 50 /HPF (ref 0–5)

## 2021-08-24 PROCEDURE — 6370000000 HC RX 637 (ALT 250 FOR IP): Performed by: PSYCHIATRY & NEUROLOGY

## 2021-08-24 PROCEDURE — 81001 URINALYSIS AUTO W/SCOPE: CPT

## 2021-08-24 PROCEDURE — 95819 EEG AWAKE AND ASLEEP: CPT | Performed by: PSYCHIATRY & NEUROLOGY

## 2021-08-24 PROCEDURE — 84443 ASSAY THYROID STIM HORMONE: CPT

## 2021-08-24 PROCEDURE — 85027 COMPLETE CBC AUTOMATED: CPT

## 2021-08-24 PROCEDURE — 36415 COLL VENOUS BLD VENIPUNCTURE: CPT

## 2021-08-24 PROCEDURE — 2580000003 HC RX 258: Performed by: INTERNAL MEDICINE

## 2021-08-24 PROCEDURE — 6360000002 HC RX W HCPCS: Performed by: INTERNAL MEDICINE

## 2021-08-24 PROCEDURE — 86140 C-REACTIVE PROTEIN: CPT

## 2021-08-24 PROCEDURE — 86038 ANTINUCLEAR ANTIBODIES: CPT

## 2021-08-24 PROCEDURE — 2500000003 HC RX 250 WO HCPCS: Performed by: INTERNAL MEDICINE

## 2021-08-24 PROCEDURE — 92610 EVALUATE SWALLOWING FUNCTION: CPT

## 2021-08-24 PROCEDURE — 70553 MRI BRAIN STEM W/O & W/DYE: CPT

## 2021-08-24 PROCEDURE — 82550 ASSAY OF CK (CPK): CPT

## 2021-08-24 PROCEDURE — 6360000002 HC RX W HCPCS: Performed by: PSYCHIATRY & NEUROLOGY

## 2021-08-24 PROCEDURE — 6370000000 HC RX 637 (ALT 250 FOR IP): Performed by: INTERNAL MEDICINE

## 2021-08-24 PROCEDURE — 92523 SPEECH SOUND LANG COMPREHEN: CPT

## 2021-08-24 PROCEDURE — 72156 MRI NECK SPINE W/O & W/DYE: CPT

## 2021-08-24 PROCEDURE — 80053 COMPREHEN METABOLIC PANEL: CPT

## 2021-08-24 PROCEDURE — 94640 AIRWAY INHALATION TREATMENT: CPT

## 2021-08-24 PROCEDURE — 6360000002 HC RX W HCPCS

## 2021-08-24 PROCEDURE — A9577 INJ MULTIHANCE: HCPCS | Performed by: PSYCHIATRY & NEUROLOGY

## 2021-08-24 PROCEDURE — 72157 MRI CHEST SPINE W/O & W/DYE: CPT

## 2021-08-24 PROCEDURE — G0378 HOSPITAL OBSERVATION PER HR: HCPCS

## 2021-08-24 PROCEDURE — 86431 RHEUMATOID FACTOR QUANT: CPT

## 2021-08-24 PROCEDURE — 83605 ASSAY OF LACTIC ACID: CPT

## 2021-08-24 PROCEDURE — 99223 1ST HOSP IP/OBS HIGH 75: CPT | Performed by: PSYCHIATRY & NEUROLOGY

## 2021-08-24 PROCEDURE — 82607 VITAMIN B-12: CPT

## 2021-08-24 PROCEDURE — 85652 RBC SED RATE AUTOMATED: CPT

## 2021-08-24 PROCEDURE — 95819 EEG AWAKE AND ASLEEP: CPT

## 2021-08-24 PROCEDURE — 6360000004 HC RX CONTRAST MEDICATION: Performed by: PSYCHIATRY & NEUROLOGY

## 2021-08-24 PROCEDURE — 72158 MRI LUMBAR SPINE W/O & W/DYE: CPT

## 2021-08-24 RX ORDER — PREGABALIN 75 MG/1
75 CAPSULE ORAL 2 TIMES DAILY
COMMUNITY
Start: 2021-07-06 | End: 2021-10-05

## 2021-08-24 RX ORDER — CYCLOBENZAPRINE HCL 10 MG
10 TABLET ORAL 3 TIMES DAILY
Status: DISCONTINUED | OUTPATIENT
Start: 2021-08-24 | End: 2021-08-27 | Stop reason: HOSPADM

## 2021-08-24 RX ORDER — ZOLPIDEM TARTRATE 5 MG/1
10 TABLET ORAL NIGHTLY
Status: DISCONTINUED | OUTPATIENT
Start: 2021-08-24 | End: 2021-08-27 | Stop reason: HOSPADM

## 2021-08-24 RX ORDER — ESCITALOPRAM OXALATE 10 MG/1
20 TABLET ORAL DAILY
Status: DISCONTINUED | OUTPATIENT
Start: 2021-08-24 | End: 2021-08-27 | Stop reason: HOSPADM

## 2021-08-24 RX ORDER — PREGABALIN 75 MG/1
75 CAPSULE ORAL 2 TIMES DAILY
Status: DISCONTINUED | OUTPATIENT
Start: 2021-08-24 | End: 2021-08-27 | Stop reason: HOSPADM

## 2021-08-24 RX ORDER — CYCLOBENZAPRINE HCL 5 MG
10 TABLET ORAL 2 TIMES DAILY PRN
COMMUNITY

## 2021-08-24 RX ORDER — LAMOTRIGINE 100 MG/1
100 TABLET ORAL DAILY
Status: DISCONTINUED | OUTPATIENT
Start: 2021-08-24 | End: 2021-08-27 | Stop reason: HOSPADM

## 2021-08-24 RX ORDER — SODIUM CHLORIDE 0.9 % (FLUSH) 0.9 %
5-40 SYRINGE (ML) INJECTION PRN
Status: DISCONTINUED | OUTPATIENT
Start: 2021-08-24 | End: 2021-08-27 | Stop reason: HOSPADM

## 2021-08-24 RX ORDER — FUROSEMIDE 20 MG/1
40 TABLET ORAL DAILY
Status: ON HOLD | COMMUNITY
Start: 2021-07-16 | End: 2021-08-27 | Stop reason: HOSPADM

## 2021-08-24 RX ORDER — LORAZEPAM 2 MG/ML
1 INJECTION INTRAMUSCULAR ONCE
Status: COMPLETED | OUTPATIENT
Start: 2021-08-24 | End: 2021-08-24

## 2021-08-24 RX ORDER — ONDANSETRON 4 MG/1
4 TABLET, ORALLY DISINTEGRATING ORAL EVERY 8 HOURS PRN
Status: DISCONTINUED | OUTPATIENT
Start: 2021-08-24 | End: 2021-08-27 | Stop reason: HOSPADM

## 2021-08-24 RX ORDER — BUDESONIDE 0.5 MG/2ML
1 INHALANT ORAL 2 TIMES DAILY
Status: DISCONTINUED | OUTPATIENT
Start: 2021-08-24 | End: 2021-08-27 | Stop reason: HOSPADM

## 2021-08-24 RX ORDER — SODIUM CHLORIDE 9 MG/ML
25 INJECTION, SOLUTION INTRAVENOUS PRN
Status: DISCONTINUED | OUTPATIENT
Start: 2021-08-24 | End: 2021-08-27 | Stop reason: HOSPADM

## 2021-08-24 RX ORDER — GABAPENTIN 600 MG/1
600 TABLET ORAL 3 TIMES DAILY
COMMUNITY

## 2021-08-24 RX ORDER — ATORVASTATIN CALCIUM 40 MG/1
40 TABLET, FILM COATED ORAL DAILY
Status: DISCONTINUED | OUTPATIENT
Start: 2021-08-24 | End: 2021-08-27 | Stop reason: HOSPADM

## 2021-08-24 RX ORDER — ASPIRIN 81 MG/1
81 TABLET, CHEWABLE ORAL DAILY
Status: DISCONTINUED | OUTPATIENT
Start: 2021-08-24 | End: 2021-08-27 | Stop reason: HOSPADM

## 2021-08-24 RX ORDER — ZOLPIDEM TARTRATE 10 MG/1
10 TABLET ORAL NIGHTLY
COMMUNITY

## 2021-08-24 RX ORDER — ESCITALOPRAM OXALATE 20 MG/1
20 TABLET ORAL DAILY
COMMUNITY
Start: 2021-04-25

## 2021-08-24 RX ORDER — MELOXICAM 15 MG/1
15 TABLET ORAL DAILY
COMMUNITY
Start: 2021-06-13

## 2021-08-24 RX ORDER — MELOXICAM 7.5 MG/1
15 TABLET ORAL DAILY
Status: DISCONTINUED | OUTPATIENT
Start: 2021-08-24 | End: 2021-08-27 | Stop reason: HOSPADM

## 2021-08-24 RX ORDER — ALPRAZOLAM 1 MG/1
1 TABLET ORAL 4 TIMES DAILY PRN
COMMUNITY
Start: 2021-05-10

## 2021-08-24 RX ORDER — GABAPENTIN 600 MG/1
600 TABLET ORAL 3 TIMES DAILY
Status: DISCONTINUED | OUTPATIENT
Start: 2021-08-24 | End: 2021-08-27 | Stop reason: HOSPADM

## 2021-08-24 RX ORDER — LAMOTRIGINE 100 MG/1
100 TABLET ORAL DAILY
COMMUNITY
Start: 2021-05-10

## 2021-08-24 RX ORDER — SODIUM CHLORIDE 0.9 % (FLUSH) 0.9 %
5-40 SYRINGE (ML) INJECTION EVERY 12 HOURS SCHEDULED
Status: DISCONTINUED | OUTPATIENT
Start: 2021-08-24 | End: 2021-08-27 | Stop reason: HOSPADM

## 2021-08-24 RX ORDER — ONDANSETRON 4 MG/1
4 TABLET, ORALLY DISINTEGRATING ORAL EVERY 6 HOURS PRN
COMMUNITY
Start: 2021-05-12

## 2021-08-24 RX ORDER — ONDANSETRON 2 MG/ML
4 INJECTION INTRAMUSCULAR; INTRAVENOUS EVERY 6 HOURS PRN
Status: DISCONTINUED | OUTPATIENT
Start: 2021-08-24 | End: 2021-08-27 | Stop reason: HOSPADM

## 2021-08-24 RX ORDER — ARIPIPRAZOLE 2 MG/1
2 TABLET ORAL DAILY
COMMUNITY
Start: 2021-08-11

## 2021-08-24 RX ORDER — ACETAMINOPHEN 325 MG/1
650 TABLET ORAL EVERY 6 HOURS PRN
Status: DISCONTINUED | OUTPATIENT
Start: 2021-08-24 | End: 2021-08-27 | Stop reason: HOSPADM

## 2021-08-24 RX ORDER — ALPRAZOLAM 1 MG/1
1 TABLET ORAL 4 TIMES DAILY PRN
Status: DISCONTINUED | OUTPATIENT
Start: 2021-08-24 | End: 2021-08-27 | Stop reason: HOSPADM

## 2021-08-24 RX ORDER — POLYETHYLENE GLYCOL 3350 17 G/17G
17 POWDER, FOR SOLUTION ORAL DAILY PRN
Status: DISCONTINUED | OUTPATIENT
Start: 2021-08-24 | End: 2021-08-27 | Stop reason: HOSPADM

## 2021-08-24 RX ORDER — ACETAMINOPHEN 650 MG/1
650 SUPPOSITORY RECTAL EVERY 6 HOURS PRN
Status: DISCONTINUED | OUTPATIENT
Start: 2021-08-24 | End: 2021-08-27 | Stop reason: HOSPADM

## 2021-08-24 RX ORDER — ATORVASTATIN CALCIUM 40 MG/1
40 TABLET, FILM COATED ORAL DAILY
COMMUNITY
Start: 2021-08-10 | End: 2021-09-10

## 2021-08-24 RX ORDER — TIZANIDINE 4 MG/1
4 TABLET ORAL 2 TIMES DAILY PRN
Status: ON HOLD | COMMUNITY
Start: 2021-08-16 | End: 2021-08-27 | Stop reason: HOSPADM

## 2021-08-24 RX ORDER — LEVETIRACETAM 500 MG/1
500 TABLET ORAL 2 TIMES DAILY
Status: DISCONTINUED | OUTPATIENT
Start: 2021-08-24 | End: 2021-08-27

## 2021-08-24 RX ORDER — LORAZEPAM 2 MG/ML
INJECTION INTRAMUSCULAR
Status: COMPLETED
Start: 2021-08-24 | End: 2021-08-24

## 2021-08-24 RX ORDER — 0.9 % SODIUM CHLORIDE 0.9 %
500 INTRAVENOUS SOLUTION INTRAVENOUS ONCE
Status: DISCONTINUED | OUTPATIENT
Start: 2021-08-24 | End: 2021-08-27 | Stop reason: HOSPADM

## 2021-08-24 RX ORDER — ARIPIPRAZOLE 2 MG/1
2 TABLET ORAL DAILY
Status: DISCONTINUED | OUTPATIENT
Start: 2021-08-24 | End: 2021-08-27 | Stop reason: HOSPADM

## 2021-08-24 RX ORDER — ASPIRIN 81 MG/1
81 TABLET, CHEWABLE ORAL DAILY
COMMUNITY
Start: 2021-08-11 | End: 2021-09-11

## 2021-08-24 RX ADMIN — ALPRAZOLAM 1 MG: 1 TABLET ORAL at 12:21

## 2021-08-24 RX ADMIN — LEVETIRACETAM 500 MG: 500 TABLET ORAL at 20:33

## 2021-08-24 RX ADMIN — ARIPIPRAZOLE 2 MG: 2 TABLET ORAL at 08:27

## 2021-08-24 RX ADMIN — GABAPENTIN 600 MG: 600 TABLET, FILM COATED ORAL at 20:33

## 2021-08-24 RX ADMIN — ZOLPIDEM TARTRATE 10 MG: 5 TABLET ORAL at 21:29

## 2021-08-24 RX ADMIN — GADOBENATE DIMEGLUMINE 20 ML: 529 INJECTION, SOLUTION INTRAVENOUS at 15:45

## 2021-08-24 RX ADMIN — ENOXAPARIN SODIUM 40 MG: 40 INJECTION SUBCUTANEOUS at 08:30

## 2021-08-24 RX ADMIN — PREGABALIN 75 MG: 75 CAPSULE ORAL at 20:33

## 2021-08-24 RX ADMIN — LORAZEPAM 1 MG: 2 INJECTION INTRAMUSCULAR; INTRAVENOUS at 14:31

## 2021-08-24 RX ADMIN — LAMOTRIGINE 100 MG: 100 TABLET ORAL at 08:27

## 2021-08-24 RX ADMIN — LORAZEPAM 1 MG: 2 INJECTION INTRAMUSCULAR at 16:17

## 2021-08-24 RX ADMIN — SODIUM CHLORIDE, PRESERVATIVE FREE 10 ML: 5 INJECTION INTRAVENOUS at 12:22

## 2021-08-24 RX ADMIN — FAMOTIDINE 20 MG: 10 INJECTION, SOLUTION INTRAVENOUS at 20:33

## 2021-08-24 RX ADMIN — LORAZEPAM 1 MG: 2 INJECTION, SOLUTION INTRAMUSCULAR; INTRAVENOUS at 16:17

## 2021-08-24 RX ADMIN — BUDESONIDE 1000 MCG: 0.5 SUSPENSION RESPIRATORY (INHALATION) at 19:36

## 2021-08-24 RX ADMIN — CYCLOBENZAPRINE 10 MG: 10 TABLET, FILM COATED ORAL at 08:27

## 2021-08-24 RX ADMIN — CYCLOBENZAPRINE 10 MG: 10 TABLET, FILM COATED ORAL at 20:33

## 2021-08-24 RX ADMIN — FAMOTIDINE 20 MG: 10 INJECTION, SOLUTION INTRAVENOUS at 12:34

## 2021-08-24 RX ADMIN — ALPRAZOLAM 1 MG: 1 TABLET ORAL at 21:29

## 2021-08-24 RX ADMIN — ASPIRIN 81 MG: 81 TABLET, CHEWABLE ORAL at 09:30

## 2021-08-24 RX ADMIN — CYCLOBENZAPRINE 10 MG: 10 TABLET, FILM COATED ORAL at 06:06

## 2021-08-24 RX ADMIN — GABAPENTIN 600 MG: 600 TABLET, FILM COATED ORAL at 08:27

## 2021-08-24 RX ADMIN — ESCITALOPRAM OXALATE 20 MG: 10 TABLET, FILM COATED ORAL at 08:27

## 2021-08-24 RX ADMIN — ONDANSETRON HYDROCHLORIDE 4 MG: 2 SOLUTION INTRAMUSCULAR; INTRAVENOUS at 12:21

## 2021-08-24 RX ADMIN — BUDESONIDE 500 MCG: 0.5 SUSPENSION RESPIRATORY (INHALATION) at 06:12

## 2021-08-24 RX ADMIN — MELOXICAM 15 MG: 7.5 TABLET ORAL at 08:28

## 2021-08-24 RX ADMIN — LEVETIRACETAM 500 MG: 500 TABLET ORAL at 09:30

## 2021-08-24 RX ADMIN — PREGABALIN 75 MG: 75 CAPSULE ORAL at 08:27

## 2021-08-24 RX ADMIN — ATORVASTATIN CALCIUM 40 MG: 40 TABLET, FILM COATED ORAL at 08:27

## 2021-08-24 RX ADMIN — ACETAMINOPHEN 650 MG: 325 TABLET ORAL at 06:06

## 2021-08-24 ASSESSMENT — PAIN DESCRIPTION - LOCATION
LOCATION: GENERALIZED
LOCATION: GENERALIZED

## 2021-08-24 ASSESSMENT — ENCOUNTER SYMPTOMS
RESPIRATORY NEGATIVE: 1
EYES NEGATIVE: 1
GASTROINTESTINAL NEGATIVE: 1

## 2021-08-24 ASSESSMENT — PAIN SCALES - GENERAL
PAINLEVEL_OUTOF10: 5
PAINLEVEL_OUTOF10: 5
PAINLEVEL_OUTOF10: 8
PAINLEVEL_OUTOF10: 8

## 2021-08-24 ASSESSMENT — PAIN DESCRIPTION - PAIN TYPE
TYPE: CHRONIC PAIN;ACUTE PAIN
TYPE: CHRONIC PAIN;ACUTE PAIN

## 2021-08-24 NOTE — H&P
HISTORY AND PHYSICAL             Date: 2021        Patient Name: Giana Zarco     YOB: 1995      Age:  31 y.o. Chief Complaint      She is here to follow up for seizure vs ms flare  It is hard for me to distinguish the two from the history she gives me. History Obtained From   Patient     History of Present Illness   She begins the interview off with saying she is hurting all over. She cannot tell me why. She tell me she frequently hurts all over and she takes tylenol and aleve alternating. She states she was dx with ms by dr. Rodrigo Ibarra and he was further evaluating her for back surgery etc and she was undergoing evaluation for bladder and urine retention. She also has had issues with ambulation and uses a walker     She has increased difficulty walking and falls. She had increased tremors    And increased unsteady gait     Imbalance     Vertigo    Weakness       Past Medical History     Past Medical History:   Diagnosis Date    Asthma     Cerebral artery occlusion with cerebral infarction ([facility] [address]     2021    Movement disorder     Psychiatric problem     Seizures ([facility] [address]     Tachycardia 2021        Past Surgical History     Past Surgical History:   Procedure Laterality Date     SECTION  2016    HYSTERECTOMY      2018    INSERTABLE CARDIAC MONITOR  2021        Medications Prior to Admission     Prior to Admission medications    Medication Sig Start Date End Date Taking? Authorizing Provider   gabapentin (NEURONTIN) 600 MG tablet Take 600 mg by mouth 3 times daily. Yes Historical Provider, MD   ALPRAZolam Bettie Archie) 1 MG tablet Take 1 mg by mouth 4 times daily as needed.  5/10/21  Yes Historical Provider, MD   ARIPiprazole (ABILIFY) 2 MG tablet Take 2 mg by mouth daily 21  Yes Historical Provider, MD   aspirin 81 MG chewable tablet Take 81 mg by mouth daily 21 Yes Historical Provider, MD   atorvastatin (LIPITOR) 40 MG tablet Take 40 mg by mouth daily 8/10/21 9/10/21 Yes Historical Provider, MD   cyclobenzaprine (FLEXERIL) 5 MG tablet Take 10 mg by mouth 2 times daily as needed   Yes Historical Provider, MD   escitalopram (LEXAPRO) 20 MG tablet Take 20 mg by mouth daily 4/25/21  Yes Historical Provider, MD   furosemide (LASIX) 20 MG tablet Take 40 mg by mouth daily 7/16/21  Yes Historical Provider, MD   lamoTRIgine (LAMICTAL) 100 MG tablet Take 100 mg by mouth daily 5/10/21  Yes Historical Provider, MD   meloxicam (MOBIC) 15 MG tablet Take 15 mg by mouth daily 6/13/21  Yes Historical Provider, MD   ondansetron (ZOFRAN-ODT) 4 MG disintegrating tablet Take 4 mg by mouth every 6 hours as needed 5/12/21  Yes Historical Provider, MD   pregabalin (LYRICA) 75 MG capsule Take 75 mg by mouth 2 times daily. 7/6/21 10/5/21 Yes Historical Provider, MD   tiZANidine (ZANAFLEX) 4 MG tablet Take 4 mg by mouth 2 times daily as needed 8/16/21  Yes Historical Provider, MD   zolpidem (AMBIEN) 10 MG tablet Take 10 mg by mouth nightly.    Yes Historical Provider, MD   beclomethasone (QVAR REDIHALER) 80 MCG/ACT AERB inhaler Inhale 2 puffs into the lungs as needed   Yes Historical Provider, MD        Allergies   Cheese    Social History     Social History     Tobacco History     Smoking Status  Never Smoker    Smokeless Tobacco Use  Never Used          Alcohol History     Alcohol Use Status  Never          Drug Use     Drug Use Status  Yes Types  Marijuana          Sexual Activity     Sexually Active  Not Asked                Family History     Family History   Problem Relation Age of Onset    No Known Problems Mother     Cancer Father     High Cholesterol Father     No Known Problems Sister     No Known Problems Brother     No Known Problems Maternal Grandmother     No Known Problems Maternal Grandfather     Heart Disease Paternal Grandmother     Diabetes Paternal Grandmother     No Known Problems Paternal Grandfather     Other Child     Other Child  No Known Problems Sister        Review of Systems   Review of Systems   Constitutional: Positive for activity change. HENT: Negative. Eyes: Negative. Respiratory: Negative. Cardiovascular: Negative. Gastrointestinal: Negative. Endocrine: Negative. Musculoskeletal: Positive for arthralgias. Neurological: Positive for tremors. Physical Exam   BP (!) 107/54   Pulse 96   Temp 97.1 °F (36.2 °C) (Temporal)   Resp 18   Wt 226 lb 4.8 oz (102.6 kg)   SpO2 97%     Physical Exam  Vitals and nursing note reviewed. Constitutional:       Appearance: She is obese. She is ill-appearing and diaphoretic. HENT:      Head: Normocephalic. Nose: Nose normal.   Eyes:      Conjunctiva/sclera: Conjunctivae normal.   Cardiovascular:      Rate and Rhythm: Regular rhythm. Tachycardia present. Pulmonary:      Effort: Pulmonary effort is normal.   Abdominal:      General: Abdomen is flat. Bowel sounds are normal.   Musculoskeletal:         General: Normal range of motion. Cervical back: Normal range of motion. Right lower leg: Edema present. Left lower leg: Edema present. Skin:     General: Skin is warm. Neurological:      General: No focal deficit present. Labs    No results found for this or any previous visit (from the past 24 hour(s)). Imaging/Diagnostics Last 24 Hours   No results found. Assessment      Hospital Problems         Last Modified POA    * (Principal) Seizure disorder ([facility] [address] 8/24/2021 Yes    Multiple sclerosis ([facility] [address] 8/24/2021 Yes          Plan   Seizure vs multiple sclerosis flare or other causes   Difficult for me to sort out   Muscle spasms at present   Fluids   Control pain   Flexeril   Neuro checks  Await neurology consultation   Order labs   Labs reviewed from outside hospital and they were unrevealing     Neurology consult    dvt and gi prophylaxis.          Consultations Ordered:  IP CONSULT TO NEUROLOGY    Electronically signed by Kerwin Asencio Bubba Carmen MD on 8/24/21 at 4:37 AM CDT

## 2021-08-24 NOTE — PROGRESS NOTES
PHYSICAL THERAPY    Unable to see pt at this time due to an active bedrest order. Please discontinue to begin mobility assessment.     Electronically signed by Radha Hutchinson PT on 8/24/2021 at 9:09 AM

## 2021-08-24 NOTE — PROGRESS NOTES
Mónica Saunders arrived to room # . Presented with: Seizures  Mental Status: Patient is oriented, alert, coherent, logical, thought processes intact and able to concentrate and follow conversation. There were no vitals filed for this visit. Patient safety contract and falls prevention contract reviewed with patient Yes. Oriented Patient to room. Call light within reach. Yes.   Needs, issues or concerns expressed at this time: no.      Electronically signed by Marck Oakley RN on 8/24/2021 at 1:58 AM

## 2021-08-24 NOTE — CONSULTS
[id number] [facility] Neurology Consult      Patient:   Randy Ornelas  MR#:    [id number]  Account Number:                   [id number]      Room:    [address] [place] [address]-   YOB: 1995  Date of Progress Note: 8/24/2021  Time of Note                           8:06 AM  Attending Physician:  Edilson Noel MD  Consulting Physician:  Es Carvalho DO       CHIEF COMPLAINT:  Seizure, possible MS     HISTORY OF PRESENT ILLNESS:   This is a 31 y.o. female who was admitted with new onset seizure. The patient has a complicated past medical history which includes a possible recent diagnosis of MS, possible recent stroke, new onset seizure, and chronic back pain. The patient presented to an outside ER after experiencing a possible seizure at home. She noted a sudden loss of awareness followed by generalized tonic-clonic activity. She did note urinary incontinence. She denies tongue biting. She has no prior seizure history. From an MS standpoint. Its unclear at this time if she has been truly diagnosed. She has not had a lumbar puncture and she is currently not on MS therapy. She states she is also been told recently that she has had a possible stroke as well. Symptomatically she is complaining of lower extremity predominant weakness and numbness. She denies visual difficulty or loss of vision. She denies upper extremity weakness or numbness. No facial drooping or slurred speech is noted. She is on Lamictal currently but taking for mood stabilization. She was loaded with Keppra at the outside ER. REVIEW OF SYSTEMS:  Constitutional - No fever or chills. HENT -  No Scalp tenderness. No tinnitus or significant hearing loss. No nose bleeding, no sore throat. Eyes - No sudden vision change or eye pain  Respiratory - No significant shortness of breath or cough  Cardiovascular - No chest pain. No palpitations or significant leg swelling  Gastrointestinal - No abdominal swelling or pain.     Genitourinary - at 08/24/21 0606    escitalopram (LEXAPRO) tablet 20 mg, 20 mg, Oral, Daily, Jose Lawrence MD    gabapentin (NEURONTIN) tablet 600 mg, 600 mg, Oral, TID, Jose Lawrence MD    lamoTRIgine (LAMICTAL) tablet 100 mg, 100 mg, Oral, Daily, Jose Lawrence MD    meloxicam (MOBIC) tablet 15 mg, 15 mg, Oral, Daily, Jose Lawrence MD    pregabalin (LYRICA) capsule 75 mg, 75 mg, Oral, BID, Jose Lawrence MD    zolpidem (AMBIEN) tablet 10 mg, 10 mg, Oral, Nightly, Jose Lawrence MD    0.9 % sodium chloride bolus, 500 mL, Intravenous, Once, Jose Lawrence MD    sodium chloride flush 0.9 % injection 5-40 mL, 5-40 mL, Intravenous, 2 times per day, Jose Lawrence MD    sodium chloride flush 0.9 % injection 5-40 mL, 5-40 mL, Intravenous, PRN, Jose Lawrence MD    0.9 % sodium chloride infusion, 25 mL, Intravenous, PRN, Jose Lawrence MD    enoxaparin (LOVENOX) injection 40 mg, 40 mg, Subcutaneous, Daily, Jose Lawrence MD    ondansetron (ZOFRAN-ODT) disintegrating tablet 4 mg, 4 mg, Oral, Q8H PRN **OR** ondansetron (ZOFRAN) injection 4 mg, 4 mg, Intravenous, Q6H PRN, Jose Lawrence MD    polyethylene glycol (GLYCOLAX) packet 17 g, 17 g, Oral, Daily PRN, Jose Lawrence MD    acetaminophen (TYLENOL) tablet 650 mg, 650 mg, Oral, Q6H PRN, 650 mg at 08/24/21 0606 **OR** acetaminophen (TYLENOL) suppository 650 mg, 650 mg, Rectal, Q6H PRN, Jose Lawrence MD    famotidine (PEPCID) injection 20 mg, 20 mg, Intravenous, BID, Jose Lawrence MD    ALLERGIES:    Cheese    PHYSICAL EXAM:    Constitutional -   BP (!) 107/54   Pulse 96   Temp 97.1 °F (36.2 °C) (Temporal)   Resp 20   Wt 226 lb (102.5 kg)   SpO2 96%   General appearance: No acute distress   EYES -   Conjunctiva normal  Pupillary exam as below, see CN exam in the neurologic exam  ENT-    No scars, masses, or lesions over external nose or ears  Oropharynx without erythema, palate midline  Cardiovascular -   No clubbing, cyanosis, or edema   Pulmonary-   Good expansion, normal effort without use of accessory muscles  Musculoskeletal -   No significant wasting of muscles noted  Gait as below, see gait exam in the neurologic exam  Muscle strength, tone, stability as below see the motor exam in the neurologic exam.   No bony deformities  Skin -   Warm, dry, and intact to inspection and palpation. No rash, erythema, or pallor  Psychiatric -   Mood, affect, and behavior appear normal    Memory as below see mental status examination in the neurologic exam      NEUROLOGICAL EXAM    Mental status   [x] Awake, alert, oriented   [x] Affect attention and concentration appear appropriate  [x] Recent and remote memory appears unremarkable  [x] Speech normal without dysarthria or aphasia, comprehension and repetition intact.    COMMENTS:   Cranial Nerves [x] No VF deficit to confrontation  [x] PERRLA, EOMI, no nystagmus, conjugate eye movements, no ptosis  [x] Face symmetric  [x] Facial sensation intact  [x] Tongue midline no atrophy or fasciculations present  [x] Palate midline, hearing to finger rub normal  [x] Shoulder shrug and SCM testing normal  COMMENTS:   Motor   [] 5/5 strength x 4 extremities  [x] Normal bulk and tone  [x] No tremor present  [x] No rigidity or bradykinesia noted  COMMENTS: 4/5 BLE   Sensory  [] Sensation intact to light touch, pin prick, vibration, and proprioception BLE  [] Sensation intact to light touch, pin prick, vibration, and proprioception BUE  COMMENTS: Decreased LT, PP, Vib BLE   Coordination [x] FTN normal bilaterally   [] HTS normal bilaterally  [] LEILA normal.   COMMENTS:   Reflexes  [x] Symmetric and non-pathological  [x] Toes downgoing bilaterally  [x] No clonus present  COMMENTS:   Gait                  [] Normal steady gait    [] Ataxic    [] Spastic     [] Magnetic     [] Shuffling  [x] Not assessed  COMMENTS: LABS/IMAGING:    As below and per HPI      ASSESSMENT:  31 y.o. admitted with new onset seizure, possible newly diagnosed underlying MS history, questionable recent stroke history, chronic back pain, anxiety. Plan further workup. PLAN:  1. MRI brain  2. MRI C/T/L spine  3. Additional labs  4. EEG  5. Continue Lamictal will cover possible seizure and help with mood stabilization. Add Keppra 500 mg bid for now. 6.  May Consider LP and adding steroids if MRI is consistent with demyelinating disease. Consider further stroke workup if needed as well pending MRIs. 7.  PT, OT, ST  8. DVT proph       Please feel free to call with any questions. [phone] (cell phone).     Jerica Leyva DO  Board Certified Neurology

## 2021-08-24 NOTE — PROCEDURES
ADULT INPATIENT ELECTROENCEPHALOGRAM REPORT    Patient:   Al Stevenson  MR#:    [id number]  Room #:    INPATIENT  YOB: 1995  Date of Evaluation:  8/24/2021  Referring Physician:   Ale Lindsay DO      CLINICAL INFORMATION:     This patient is a 31 y.o. female with a history of seizure. MEDICATIONS:     See MAR. RECORDING CONDITIONS:     This EEG was performed utilizing standard International 10-20 System of electrode placement, with additional channels monitored for eye movement. One channel electrocardiogram was monitored. Data was obtained, stored, and interpreted according to ACNS guidelines (J Clin Neurophysiol 2006;23(2):) utilizing referential montage recording, with reformatting to longitudinal, transverse bipolar, and referential montages as necessary for interpretation, along with the digital/automated EEG analysis. Patient tolerated entire procedure well. Photic stimulation and hyperventilation were utilized as activation procedures unless otherwise specified below. E.E.G. DESCRIPTION:     The resting predominant posterior background frequency is a 9-10 Hz 30-40 uV rhythm. No overt focal, lateralizing, or paroxysmal abnormalities were noted through the recording. Drowsiness was demonstrated by slow rolling eye movements followed by a loss of the background waking activities. Onset of stage I sleep was demonstrated by gradual disappearance of background waking rhythms with gradual symmetric mixed frequency 4-7 Hz slowing. Stage II sleep was characterized by vertex transients, sleep-spindles, and K-complexes, at times with shifting asymmetry demonstrated. Hyperventilation was not performed. Photic stimulation was performed and had little change on the recording. No ECG abnormalities were noted. Muscle, motion, and eye movement artifacts were noted. EEG INTERPRETATION:    Normal EEG for age in the awake, drowsy, and sleep states.        CLINICAL CORRELATION: The absence of epileptiform abnormalities does not preclude a clinical diagnosis of seizures.        Eric Pereyra DO  Board Certified Neurologist    Date reported: 8/24/2021  Date signed: 8/24/2021

## 2021-08-24 NOTE — PROGRESS NOTES
Speech Language Pathology  Facility/Department: [facility] SURG SERVICES  Initial Speech/Language/Cognitive/Swallow Assessment    NAME: Shayan Medrano  : 1995   MRN: [id number]  ADMISSION DATE: 2021  ADMITTING DIAGNOSIS: has Seizure disorder ([facility] [address] and Multiple sclerosis ([facility] [address] on their problem list.    Date of Eval: 2021   Evaluating Therapist: TIAN Yu    Assessment:  Completed assessment. SLP ranked functional intelligibility of speech for unfamiliar listeners at 100% in utterances with background noise present. Patient did exhibit slow processing with delayed, but appropriate, auditory comprehension and verbalizations noted. Also evaluated patient's swallowing function. Patient exhibited functional oral and pharyngeal stages of swallowing and no outward S/S penetration/aspiration was observed with any regular solid consistency trial or thin H2O trial presented during assessment this date. At this time, would recommend regular solid consistency with thin liquids. Self-feed. Will continue to follow. Thank you for this consult. Goals:  Short-term Goals  Timeframe for Short-term Goals: 1-2x/day for 3 days  Goal 1: Patient will tolerate regular solid consistency and thin liquids with min S/S penetration/aspiration during PO intake. Goal 2: Patient staff will follow swallow safety recommendations to decrease risk of penetration/aspiration during PO intake. Goal 3: Monitor [id number] functioning. Subjective:  Chart Reviewed: Yes  Patient assessed for rehabilitation services?: Yes  Family / Caregiver Present: No     Objective: Auditory Comprehension  Comprehension:  (Patient demonstrated ability to answer simple yes/no questions regarding immediate environment and current state of being at independent level and with adequate processing speed. Patient demonstrated ability to follow simple 1 step commands independently and with adequate processing speed.  While functional in timing and strength and no S/S penetration/aspiration was observed with any regular solid consistency trial or thin H2O trial presented during evaluation this date. At this time, would recommend regular solid consistency with thin liquids. Self-feed. Will continue to follow.      Electronically signed by Sunday Landau, SLP on 8/24/2021 at 12:21 PM

## 2021-08-25 PROBLEM — M48.062 SPINAL STENOSIS OF LUMBAR REGION WITH NEUROGENIC CLAUDICATION: Status: ACTIVE | Noted: 2021-08-25

## 2021-08-25 PROCEDURE — 2500000003 HC RX 250 WO HCPCS: Performed by: INTERNAL MEDICINE

## 2021-08-25 PROCEDURE — 97535 SELF CARE MNGMENT TRAINING: CPT

## 2021-08-25 PROCEDURE — 1210000000 HC MED SURG R&B

## 2021-08-25 PROCEDURE — 6370000000 HC RX 637 (ALT 250 FOR IP): Performed by: INTERNAL MEDICINE

## 2021-08-25 PROCEDURE — 6360000002 HC RX W HCPCS: Performed by: INTERNAL MEDICINE

## 2021-08-25 PROCEDURE — 92526 ORAL FUNCTION THERAPY: CPT

## 2021-08-25 PROCEDURE — 2580000003 HC RX 258: Performed by: INTERNAL MEDICINE

## 2021-08-25 PROCEDURE — 6370000000 HC RX 637 (ALT 250 FOR IP): Performed by: PSYCHIATRY & NEUROLOGY

## 2021-08-25 PROCEDURE — 97129 THER IVNTJ 1ST 15 MIN: CPT

## 2021-08-25 PROCEDURE — 97530 THERAPEUTIC ACTIVITIES: CPT

## 2021-08-25 PROCEDURE — 94640 AIRWAY INHALATION TREATMENT: CPT

## 2021-08-25 PROCEDURE — 99233 SBSQ HOSP IP/OBS HIGH 50: CPT | Performed by: PSYCHIATRY & NEUROLOGY

## 2021-08-25 PROCEDURE — 97165 OT EVAL LOW COMPLEX 30 MIN: CPT

## 2021-08-25 PROCEDURE — 97162 PT EVAL MOD COMPLEX 30 MIN: CPT

## 2021-08-25 RX ADMIN — CYCLOBENZAPRINE 10 MG: 10 TABLET, FILM COATED ORAL at 09:23

## 2021-08-25 RX ADMIN — PREGABALIN 75 MG: 75 CAPSULE ORAL at 21:10

## 2021-08-25 RX ADMIN — ACETAMINOPHEN 650 MG: 325 TABLET ORAL at 17:19

## 2021-08-25 RX ADMIN — CYCLOBENZAPRINE 10 MG: 10 TABLET, FILM COATED ORAL at 14:51

## 2021-08-25 RX ADMIN — ATORVASTATIN CALCIUM 40 MG: 40 TABLET, FILM COATED ORAL at 09:23

## 2021-08-25 RX ADMIN — LEVETIRACETAM 500 MG: 500 TABLET ORAL at 09:23

## 2021-08-25 RX ADMIN — ALPRAZOLAM 1 MG: 1 TABLET ORAL at 17:19

## 2021-08-25 RX ADMIN — BUDESONIDE 1000 MCG: 0.5 SUSPENSION RESPIRATORY (INHALATION) at 20:11

## 2021-08-25 RX ADMIN — ZOLPIDEM TARTRATE 10 MG: 5 TABLET ORAL at 21:11

## 2021-08-25 RX ADMIN — MELOXICAM 15 MG: 7.5 TABLET ORAL at 09:23

## 2021-08-25 RX ADMIN — FAMOTIDINE 20 MG: 10 INJECTION, SOLUTION INTRAVENOUS at 21:11

## 2021-08-25 RX ADMIN — BUDESONIDE 1000 MCG: 0.5 SUSPENSION RESPIRATORY (INHALATION) at 07:15

## 2021-08-25 RX ADMIN — ASPIRIN 81 MG: 81 TABLET, CHEWABLE ORAL at 09:23

## 2021-08-25 RX ADMIN — GABAPENTIN 600 MG: 600 TABLET, FILM COATED ORAL at 21:10

## 2021-08-25 RX ADMIN — GABAPENTIN 600 MG: 600 TABLET, FILM COATED ORAL at 09:23

## 2021-08-25 RX ADMIN — CYCLOBENZAPRINE 10 MG: 10 TABLET, FILM COATED ORAL at 21:10

## 2021-08-25 RX ADMIN — ENOXAPARIN SODIUM 40 MG: 40 INJECTION SUBCUTANEOUS at 09:24

## 2021-08-25 RX ADMIN — LEVETIRACETAM 500 MG: 500 TABLET ORAL at 21:11

## 2021-08-25 RX ADMIN — ARIPIPRAZOLE 2 MG: 2 TABLET ORAL at 09:24

## 2021-08-25 RX ADMIN — GABAPENTIN 600 MG: 600 TABLET, FILM COATED ORAL at 14:51

## 2021-08-25 RX ADMIN — ESCITALOPRAM OXALATE 20 MG: 10 TABLET, FILM COATED ORAL at 09:23

## 2021-08-25 RX ADMIN — SODIUM CHLORIDE, PRESERVATIVE FREE 10 ML: 5 INJECTION INTRAVENOUS at 09:24

## 2021-08-25 RX ADMIN — FAMOTIDINE 20 MG: 10 INJECTION, SOLUTION INTRAVENOUS at 09:24

## 2021-08-25 RX ADMIN — LAMOTRIGINE 100 MG: 100 TABLET ORAL at 09:23

## 2021-08-25 RX ADMIN — PREGABALIN 75 MG: 75 CAPSULE ORAL at 09:23

## 2021-08-25 RX ADMIN — SODIUM CHLORIDE, PRESERVATIVE FREE 10 ML: 5 INJECTION INTRAVENOUS at 21:11

## 2021-08-25 ASSESSMENT — PAIN DESCRIPTION - DESCRIPTORS
DESCRIPTORS: ACHING;DISCOMFORT
DESCRIPTORS: ACHING

## 2021-08-25 ASSESSMENT — PAIN SCALES - GENERAL
PAINLEVEL_OUTOF10: 4
PAINLEVEL_OUTOF10: 7
PAINLEVEL_OUTOF10: 8

## 2021-08-25 ASSESSMENT — PAIN DESCRIPTION - LOCATION
LOCATION: BACK
LOCATION: GENERALIZED
LOCATION: GENERALIZED

## 2021-08-25 ASSESSMENT — PAIN DESCRIPTION - PAIN TYPE
TYPE: CHRONIC PAIN
TYPE: ACUTE PAIN;CHRONIC PAIN
TYPE: ACUTE PAIN;CHRONIC PAIN

## 2021-08-25 ASSESSMENT — PAIN - FUNCTIONAL ASSESSMENT
PAIN_FUNCTIONAL_ASSESSMENT: PREVENTS OR INTERFERES SOME ACTIVE ACTIVITIES AND ADLS
PAIN_FUNCTIONAL_ASSESSMENT: ACTIVITIES ARE NOT PREVENTED

## 2021-08-25 NOTE — PROGRESS NOTES
Neurosurgery  Case discussed with neurology team  MRI reviewed: There is some degenerative disc disease at L5-S1 along with moderate right sided foraminal stenosis. Mild 2-3 mm spondylolisthesis. MRI findings of the lumbar spine does NOT explain her bilateral lower extremity weakness. She may follow up with her previous surgeon upon discharge.

## 2021-08-25 NOTE — PROGRESS NOTES
[facility] Neurology Progress Note      Patient:   Ajith Pratt  MR#:    [id number]   Room:    0506/506-01   YOB: 1995  Date of Progress Note: 8/25/2021  Time of Note                           7:54 AM  Consulting Physician:  Adriana Granger DO  Attending Physician:  Buffy Boyce DO      INTERVAL HISTORY:  Doing about the same overnight, no new complaints, lower extremity weakness is unchanged. REVIEW OF SYSTEMS:  Constitutional: No fevers No chills  Neck:No stiffness  Respiratory: No shortness of breath  Cardiovascular: No chest pain No palpitations  Gastrointestinal: No abdominal pain    Genitourinary: No Dysuria  Neurological: No headache, no confusion    PHYSICAL EXAM:    Constitutional -   /72   Pulse 102   Temp 96.6 °F (35.9 °C)   Resp 16   Ht 5' 6\" (1.676 m)   Wt 226 lb (102.5 kg)   SpO2 96%   BMI 36.48 kg/m²   General appearance: No acute distress   EYES -   Conjunctiva normal  Pupillary exam as below, see CN exam in the neurologic exam  ENT-    No scars, masses, or lesions over external nose or ears  Hearing normal bilaterally to finger rub  Neck-   No neck masses noted  Thyroid normal   No jugular vein distension  Cardiovascular -   No clubbing, cyanosis, or edema   Pulmonary-   Good expansion, normal effort without use of accessory muscles  Musculoskeletal -   No significant wasting of muscles noted  Gait as below, see gait exam in the neurologic exam  Muscle strength, tone, stability as below see the motor exam in the neurologic exam.   No bony deformities  Skin -   Warm, dry, and intact to inspection and palpation. No rash, erythema, or pallor  Psychiatric -   Mood, affect, and behavior appear normal    Memory as below see mental status examination in the neurologic exam    NEUROLOGICAL EXAM     Mental status    [x]? Awake, alert, oriented   [x]? Affect attention and concentration appear appropriate  [x]? Recent and remote memory appears unremarkable  [x]?  Speech normal without dysarthria or aphasia, comprehension and repetition intact. COMMENTS:   Cranial Nerves [x]? No VF deficit to confrontation  [x]? PERRLA, EOMI, no nystagmus, conjugate eye movements, no ptosis  [x]? Face symmetric  [x]? Facial sensation intact  [x]? Tongue midline no atrophy or fasciculations present  [x]? Palate midline, hearing to finger rub normal  [x]? Shoulder shrug and SCM testing normal  COMMENTS:   Motor   []? 5/5 strength x 4 extremities  [x]? Normal bulk and tone  [x]? No tremor present  [x]? No rigidity or bradykinesia noted  COMMENTS: 4/5 BLE   Sensory  []? Sensation intact to light touch, pin prick, vibration, and proprioception BLE  []? Sensation intact to light touch, pin prick, vibration, and proprioception BUE  COMMENTS: Decreased LT, PP, Vib BLE   Coordination [x]? FTN normal bilaterally   []? HTS normal bilaterally  []? LEILA normal.   COMMENTS:   Reflexes  [x]? Symmetric and non-pathological  [x]? Toes downgoing bilaterally  [x]? No clonus present  COMMENTS:   Gait                  []? Normal steady gait    []? Ataxic    []? Spastic     []? Magnetic     []? Shuffling  [x]? Not assessed  COMMENTS:        LABS/IMAGING:    MRI CERVICAL SPINE W WO CONTRAST    Result Date: 8/25/2021  MRI CERVICAL SPINE W WO CONTRAST 8/24/2021 3:40 PM HISTORY: Seizure, movement disorder COMPARISON: None TECHNIQUE: Multiplanar, multisequence MRI of the cervical spine was obtained with and without contrast. 20 mL MultiHance IV contrast. FINDINGS: The cervical spine is imaged from the posterior fossa through T2. Imaged portions of the cerebellum and brainstem are unremarkable. Normal alignment across the craniocervical junction. Alignment: Flattened lordosis. There is no evidence of listhesis or subluxation. Marrow signal: No pathologic marrow infiltrate is demonstrated. The vertebral body heights and posterior elements are maintained. Cord: The spinal cord is normal in signal and morphology.  No short Thoracic spine. 2. Thoracic cord is unremarkable. No short segment lesions or pathologic enhancement. Signed by Dr Joel Barton    MRI Yeny Fuchs    Result Date: 8/25/2021  MRI LUMBAR SPINE W WO CONTRAST 8/24/2021 3:41 PM HISTORY: Seizure, movement disorder, low back pain, weakness COMPARISON: None TECHNIQUE: Multiplanar, multisequence MRI of the lumbar spine was performed with and without the use of contrast. 20 mL MultiHance IV contrast. FINDINGS: The lumbar spine is visualized from T11 through S2. There are presumed to be 5 lumbar-type vertebrae, with the most inferior being labeled as L5. Alignment: Lumbar lordosis is maintained. There is a grade 1 lytic spondylolisthesis at L5-S1. Marrow signal: No pathologic marrow infiltrate is demonstrated. Vertebral body heights are well-maintained. Cord: The conus medullaris terminates at the level of L1. The visualized spinal cord is normal in signal and morphology. Soft tissues: Left kidney appears completely atrophic. Levels: L1-L2: No disc herniation or significant disc bulge. No significant spinal stenosis. No significant neuroforaminal narrowing. L2-L3: No disc herniation or significant disc bulge. No significant spinal stenosis. No significant neuroforaminal narrowing. L3-L4: No disc herniation or significant disc bulge. No significant spinal stenosis. No significant neuroforaminal narrowing. L4-L5: No disc herniation or significant disc bulge. No significant spinal stenosis. No significant neuroforaminal narrowing. L5-S1: Disc desiccation with loss of disc height. No significant spinal stenosis. Severe right-sided and moderate left-sided neuroforaminal narrowing as result of the lytic spondylolisthesis. 1. Grade 1 lytic spondylolisthesis at L5-S1 resulting in high-grade bilateral neuroforaminal narrowing. 2. No significant spinal stenosis. 3. Left kidney is completely atrophic.  Signed by Dr Amadeo Calderon Date: 8/25/2021  MRI BRAIN W WO CONTRAST 8/24/2021 3:25 PM HISTORY: Seizure, movement disorder Comparison: None. Technique: Multiplanar imaging of the brain was performed in a routine fashion before and after the intravenous injection of gadolinium contrast. 20 mL MultiHance IV contrast. Findings: There is no diffusion signal abnormality to suggest acute infarct. There is no intra-axial or extra-axial hemorrhage. No visualized mass lesion, white matter FLAIR abnormalities or pathologic enhancement. Normal size and configuration of the ventricular system for patient age. The posterior fossa structures are unremarkable. The pituitary gland and sella are unremarkable. The major intracranial flow voids are preserved. The orbits are unremarkable. The paranasal sinuses and mastoids are clear. Marrow signal in the calvarium and skull base appears normal.    Impression: 1. Unremarkable MRI examination of the brain. 2. No acute process identified. 3. There are no white matter FLAIR abnormalities or enhancing abnormalities to suggest underlying acute or chronic demyelination. Signed by Dr Osorio Villanueva      Lab Results   Component Value Date    WBC 7.3 08/24/2021    HGB 11.6 (L) 08/24/2021    HCT 35.0 (L) 08/24/2021    MCV 95.4 08/24/2021     08/24/2021     Lab Results   Component Value Date     08/24/2021    K 3.8 08/24/2021     08/24/2021    CO2 21 (L) 08/24/2021    BUN 16 08/24/2021    CREATININE 1.2 (H) 08/24/2021    GLUCOSE 152 (H) 08/24/2021    CALCIUM 8.3 (L) 08/24/2021    PROT 6.1 (L) 08/24/2021    LABALBU 3.7 08/24/2021    BILITOT 0.5 08/24/2021    ALKPHOS 81 08/24/2021    AST 24 08/24/2021    ALT 19 08/24/2021    LABGLOM 54 (A) 08/24/2021    GFRAA >59 08/24/2021   No results found for: INR, PROTIME    RECORD REVIEW:   Previous medical records, medications were reviewed at today's visit. Nursing/physician notes, imaging, labs and vitals reviewed.  PT,OT and/or speech notes reviewed    ASSESSMENT:  31 y.o. admitted with new onset seizure, questionably recently diagnosed with MS, questionable recent stroke history,  chronic back pain, anxiety. MRI brain negative for demyelinating lesions or evidence of new or prior stroke. Do not feel the patient has MS, stroke felt unlikely as well. MRI C/T/L spine with ddd, most severe at L5-S1 with high grade neuroforaminal narrowing. No overt cord lesions noted. Labs largely negative from a weakness standpoint. No further seizures, EEG normal.  Exam stable overnight, still with lower extremity weakness, unclear if effort related or if nf narrowing could be playing a role.      PLAN:  1. Continue Lamictal will cover possible seizure and help with mood stabilization. Continue Keppra 500 mg bid for now. 2.  Will consult neurosurgery given high grade nf narrowing in the lower lumbar spine. 3.  NCS/EMG felt low yield for GBS, myopathy - CPK normal.   4.  PT, OT, ST  5. DVT proph         Please feel free to call with any questions. [phone] (cell phone).     Jerica Leyva DO  Board Certified Neurology

## 2021-08-25 NOTE — PROGRESS NOTES
Occupational Therapy   Occupational Therapy Initial Assessment  Date: 2021   Patient Name: Paradise Kline  MRN: [id number]     : 1995    Date of Service: 2021    Discharge Recommendations:  Patient would benefit from continued therapy after discharge, 24 hour supervision or assist  OT Equipment Recommendations  Equipment Needed: Yes  Other: 20 inch wheelchair with swing away footrests, 20inch wheelchair cushion    Assessment   Assessment: Evaluation completed and tx initiated. Barriers to discharge is living on a second floor apartment. Will need creative alternatives for managing the stairs. Patient wants a wheelchair for home. Likely to make significant gains with further skilled therapy intervention. Treatment Diagnosis: Seizure Disorder, possible MS, possible recent stroke  REQUIRES OT FOLLOW UP: Yes  Activity Tolerance  Activity Tolerance: Patient Tolerated treatment well  Safety Devices  Safety Devices in place: Yes  Type of devices: Call light within reach; Chair alarm in place           Patient Diagnosis(es): There were no encounter diagnoses. has a past medical history of Asthma, Cerebral artery occlusion with cerebral infarction [facility]), Movement disorder, Psychiatric problem, Seizures ([facility] [address] and Tachycardia. has a past surgical history that includes  section (2016); Hysterectomy; and Insertable Cardiac Monitor (2021).     Treatment Diagnosis: Seizure Disorder, possible MS, possible recent stroke      Restrictions  Restrictions/Precautions  Restrictions/Precautions: Fall Risk    Subjective   General  Chart Reviewed: Yes  Patient assessed for rehabilitation services?: Yes  Family / Caregiver Present: No  Patient Currently in Pain: Yes  Pain Assessment  Pain Assessment: 0-10  Pain Level: 4  Pain Type: Chronic pain  Pain Location: Generalized  Pain Descriptors: Aching  Functional Pain Assessment: Prevents or interferes some active activities and ADLs  Non-Pharmaceutical Pain Intervention(s): Ambulation/Increased Activity;Repositioned  Response to Pain Intervention: Patient Satisfied  Vital Signs  Patient Currently in Pain: Yes  Social/Functional History  Social/Functional History  Lives With:  (GIRLFRIEND)  Type of Home: Apartment  Home Layout:  (2ND FLOOR)  Bathroom Shower/Tub: Tub/Shower unit  Bathroom Equipment: Shower chair  Home Equipment: Rolling walker  ADL Assistance: Needs assistance  Homemaking Assistance: Needs assistance  Ambulation Assistance:  ([address] MOD IND)  Transfer Assistance: Independent       Objective        Orientation  Overall Orientation Status: Within Normal Limits  Observation/Palpation  Posture: Fair  Balance  Sitting Balance: Independent  Standing Balance: Minimal assistance  Functional Mobility  Functional - Mobility Device: Rolling Walker  Assist Level: Minimal assistance (with SBA of another for safety)  Toilet Transfers  Equipment Used: Extra wide bedside commode  Toilet Transfer: Minimal assistance  ADL  Feeding: Independent  Grooming: Independent;Setup  UE Bathing: Independent;Setup  LE Bathing: Minimal assistance  UE Dressing: Independent;Setup  LE Dressing: Minimal assistance  Toileting: Minimal assistance        Bed mobility  Supine to Sit: Minimal assistance  Transfers  Stand Step Transfers: Minimal assistance     Cognition  Overall Cognitive Status: WNL        Sensation  Overall Sensation Status: Impaired (BILATERAL GR TOE PROPRIOCEPTION SEVERELY IMPAIRED.)  Light Touch: Severe deficits in the RLE;Partial deficits in the LLE (Pt REPORTS L WORSE THAN R)        LUE PROM (degrees)  LUE PROM: WNL  LUE AROM (degrees)  LUE AROM : WNL  RUE PROM (degrees)  RUE PROM: WNL  RUE AROM (degrees)  RUE AROM : WNL                    Tx initiated:  Balance and mobility task.   (15 mins)    Plan   Plan  Times per week: 3-5    G-Code     OutComes Score                                                  AM-PAC Score             Goals  Short term goals  Short term goal 1: Modified independent with dressing  Short term goal 2: Modified independent with toileting  Short term goal 3: Modified independent with transfers  Short term goal 4: Verbalize DME, AE recommendations       Therapy Time   Individual Concurrent Group Co-treatment   Time In           Time Out           Minutes                   Porfirio Leventhal, OT Electronically signed by Porfirio Leventhal, OT on 8/25/2021 at 2:33 PM

## 2021-08-25 NOTE — PROGRESS NOTES
Hospitalist Progress Note  8/25/2021 5:14 PM  Subjective:   Admit Date: 8/24/2021  PCP: No primary care provider on file. Chief Complaint: weakness, pain    Subjective: Patient continues to complain of weakness. Worked with PT and OT but was very limited, unsafe to discharge to home due to second story apartment with inability to get into the home at present. History is otherwise unchanged. Cumulative Hospital History:   8-24: Received in transfer from OSH with weakness and pain, suspected seizure or MS flare. Diagnosed with MS, stroke, and spondylolisthesis, have been discussing back surgery for about a year. Multiple falls at home due to weakness, unsteady gait, tremors. Admitted to med/surg with neurology consult. 8-25: Workup has been unrevealing, low likelihood of MS, stroke, or seizure disorder. The patient does have spinal stenosis so neurosurgery was consulted. Disease would not explain her constellation of symptoms, feels outpatient follow up with her neurosurgeon is warranted. She did poorly with PT and there are concerns about discharging her to a second story apartment, so will convert to inpatient to keep for reevaluation tomorrow. May need placement for PT/OT. ROS: 14 point review of systems is negative except as specifically addressed above. ADULT DIET; Regular;  No swiss cheese    Intake/Output Summary (Last 24 hours) at 8/25/2021 1714  Last data filed at 8/25/2021 0434  Gross per 24 hour   Intake 960 ml   Output 1600 ml   Net -640 ml     Medications:   sodium chloride       Current Facility-Administered Medications   Medication Dose Route Frequency Provider Last Rate Last Admin    ALPRAZolam (XANAX) tablet 1 mg  1 mg Oral 4x Daily PRN Dalia Mcclendon MD   1 mg at 08/24/21 2129    ARIPiprazole (ABILIFY) tablet 2 mg  2 mg Oral Daily Dalia Mcclendon MD   2 mg at 08/25/21 7752    aspirin chewable tablet 81 mg  81 mg Oral Daily Dalia Mcclendon MD   81 mg at 08/25/21 0923    atorvastatin (LIPITOR) tablet 40 mg  40 mg Oral Daily Ayesha Cabral MD   40 mg at 08/25/21 0923    budesonide (PULMICORT) nebulizer suspension 1,000 mcg  1 mg Nebulization BID Ayesha Cabral MD   1,000 mcg at 08/25/21 0715    cyclobenzaprine (FLEXERIL) tablet 10 mg  10 mg Oral TID Ayesha Cabral MD   10 mg at 08/25/21 1451    escitalopram (LEXAPRO) tablet 20 mg  20 mg Oral Daily Ayesha Cabral MD   20 mg at 08/25/21 9047    gabapentin (NEURONTIN) tablet 600 mg  600 mg Oral TID Ayesha Cabral MD   600 mg at 08/25/21 1451    lamoTRIgine (LAMICTAL) tablet 100 mg  100 mg Oral Daily Ayesha Cabral MD   100 mg at 08/25/21 0923    meloxicam (MOBIC) tablet 15 mg  15 mg Oral Daily Ayesha Cabral MD   15 mg at 08/25/21 4249    pregabalin (LYRICA) capsule 75 mg  75 mg Oral BID Ayesha Cabral MD   75 mg at 08/25/21 0923    zolpidem (AMBIEN) tablet 10 mg  10 mg Oral Nightly Ayesha Cabral MD   10 mg at 08/24/21 2129    0.9 % sodium chloride bolus  500 mL IntraVENous Once Ayesha Cabral MD        sodium chloride flush 0.9 % injection 5-40 mL  5-40 mL IntraVENous 2 times per day Ayesha Cabral MD   10 mL at 08/25/21 0924    sodium chloride flush 0.9 % injection 5-40 mL  5-40 mL IntraVENous PRN Ayesha Cabral MD        0.9 % sodium chloride infusion  25 mL IntraVENous PRN Ayesha Cabral MD        enoxaparin (LOVENOX) injection 40 mg  40 mg Subcutaneous Daily Ayesha Cabral MD   40 mg at 08/25/21 0924    ondansetron (ZOFRAN-ODT) disintegrating tablet 4 mg  4 mg Oral Q8H PRN Ayesha Cabral MD        Or    ondansetron (ZOFRAN) injection 4 mg  4 mg IntraVENous Q6H PRN Ayesha Cabral MD   4 mg at 08/24/21 1221    polyethylene glycol (GLYCOLAX) packet 17 g  17 g Oral Daily PRN Ayesha Cabral MD        acetaminophen (TYLENOL) tablet 650 mg  650 mg Oral Q6H PRN Segundo Gallegos MD   650 mg at 08/24/21 0606    Or    acetaminophen (TYLENOL) suppository 650 mg  650 mg Rectal Q6H PRN Segundo Gallegos MD        famotidine (PEPCID) injection 20 mg  20 mg IntraVENous BID Segundo Gallegos MD   20 mg at 08/25/21 0924    levETIRAcetam (KEPPRA) tablet 500 mg  500 mg Oral BID Mikkiburak DO Tanner   500 mg at 08/25/21 9905        Labs:     Recent Labs     08/24/21  0432   WBC 7.3   RBC 3.67*   HGB 11.6*   HCT 35.0*   MCV 95.4   MCH 31.6*   MCHC 33.1        Recent Labs     08/24/21  0855      K 3.8   ANIONGAP 14      CO2 21*   BUN 16   CREATININE 1.2*   GLUCOSE 152*   CALCIUM 8.3*     No results for input(s): MG, PHOS in the last 72 hours. Recent Labs     08/24/21  0855   AST 24   ALT 19   BILITOT 0.5   ALKPHOS 81     ABGs:No results for input(s): PH, PO2, PCO2, HCO3, BE, O2SAT in the last 72 hours. Troponin T: No results for input(s): TROPONINI in the last 72 hours. INR: No results for input(s): INR in the last 72 hours.   Lactic Acid:   Recent Labs     08/24/21  0639   LACTA 1.2       Objective:   Vitals: /76   Pulse 116   Temp 97 °F (36.1 °C)   Resp 16   Ht 5' 6\" (1.676 m)   Wt 226 lb (102.5 kg)   SpO2 98%   BMI 36.48 kg/m²   24HR INTAKE/OUTPUT:      Intake/Output Summary (Last 24 hours) at 8/25/2021 1714  Last data filed at 8/25/2021 0434  Gross per 24 hour   Intake 960 ml   Output 1600 ml   Net -640 ml     General appearance: alert and cooperative with exam  HEENT: atraumatic, eyes with clear conjunctiva and normal lids, pupils and irises normal, external ears and nose are normal, lips normal  Neck without masses, lympadenopathy, bruit, thyroid normal  Lungs: no increased work of breathing, \"clear to auscultation bilaterally\" without rales, rhonchi or wheezes  Heart: regular rate and rhythm, S1, S2 normal, no murmur, click, rub or gallop  Abdomen: soft, non-tender; bowel sounds normal; no masses,  no organomegaly and obese  Extremities: extremities normal, atraumatic, no cyanosis or edema  Neurologic: no focal neurologic deficits, normal sensation, alert and oriented, affect and mood appropriate  Skin: no rashes, nodules    Assessment and Plan:   Principal Problem:    Spinal stenosis of lumbar region with neurogenic claudication  Active Problems:    Seizure disorder ([facility] [address]    Multiple sclerosis ([facility] [address]  Resolved Problems:    * No resolved hospital problems. *      Convert to inpatient status due to lack of safe discharge plan. She is in a second story apartment, could not safely get in or out of the home at present. PT and OT reevaluation tomorrow. May need placement if she does not show improved function. Workup unrevealing, MS, seizure disorder, and stroke all felt unlikely.     Advance Directive: Full Code    DVT prophylaxis: enoxaparin    Discharge planning: DO Deyanira Kulkarni Hospitalist

## 2021-08-25 NOTE — PROGRESS NOTES
Physical Therapy    Facility/Department: [facility] SURG SERVICES  Initial Assessment    NAME: Leilani Oliva  : 1995  MRN: [id number]    Date of Service: 2021    Discharge Recommendations:  Continue to assess pending progress (QUESTIONABLE DUE TO 2ND STORY APT. MAY BE SAFE TO FUNCTION AT W/C LEVEL AT HOME WHILE HAVING ASSIST AND ONGOING THERAPY.)        Assessment   Body structures, Functions, Activity limitations: Decreased functional mobility ; Decreased ADL status; Decreased strength;Decreased ROM; Decreased posture;Decreased balance;Decreased sensation  Assessment: Pt VERY WEAK IN LE'S. ABLE TO STAND AND TAKE SMALL STEPS TO RECLINER USING WALKER. PRESENTS WITH MAJOR LIMITATIONS IN SENSATION BILATERAL LE'S. WILL CONT TO FOLLOW. MAY NOT BE SAFE TO RETURN HOME AS Pt DESCRIBES LIVING IN A 2ND STORY APT WITH STEPS. PT Education: PT Role;Plan of Care  REQUIRES PT FOLLOW UP: Yes  Activity Tolerance  Activity Tolerance: Patient Tolerated treatment well       Patient Diagnosis(es): There were no encounter diagnoses. has a past medical history of Asthma, Cerebral artery occlusion with cerebral infarction [facility]), Movement disorder, Psychiatric problem, Seizures ([facility] [address] and Tachycardia. has a past surgical history that includes  section (2016); Hysterectomy; and Insertable Cardiac Monitor (2021).     Restrictions  Restrictions/Precautions  Restrictions/Precautions: Fall Risk  Vision/Hearing  Vision: Within Functional Limits  Hearing: Within functional limits     Subjective  General  Diagnosis: BACK PAIN, LE WEAKNESS/NUMBNESS  Subjective  Subjective: Pt PLEASANT, WILLING TO PARTICIPATE  Pain Screening  Patient Currently in Pain: Yes  Pain Assessment  Pain Assessment: Faces  Pain Level: 4  Pain Type: Acute pain;Chronic pain  Pain Location: Back  Pain Descriptors: Aching;Discomfort  Functional Pain Assessment: Activities are not prevented  Non-Pharmaceutical Pain Intervention(s): Ambulation/Increased Activity;Repositioned; Rest  Response to Pain Intervention: Patient Satisfied  Vital Signs  Patient Currently in Pain: Yes       Orientation  Orientation  Overall Orientation Status: Within Normal Limits  Social/Functional History  Social/Functional History  Lives With:  (GIRLFRIEND)  Type of Home: Apartment  Home Layout:  (2ND FLOOR)  Bathroom Shower/Tub: Tub/Shower unit  Bathroom Equipment: Shower chair  Home Equipment: Rolling walker  ADL Assistance: Needs assistance  Homemaking Assistance: Needs assistance  Ambulation Assistance:  (RECENTLY WALKING SHORT DISTANCES MOD IND)  Transfer Assistance: Independent  Cognition   Cognition  Overall Cognitive Status: WNL    Objective     Observation/Palpation  Posture: Fair    PROM RLE (degrees)  RLE PROM: WNL  PROM LLE (degrees)  LLE PROM: WNL  Strength RLE  Strength RLE: Exception  R Hip Flexion: 3-/5  R Knee Extension: 4+/5  R Ankle Dorsiflexion: 3+/5  Strength LLE  L Hip Flexion: 3-/5  L Knee Extension: 4+/5  L Ankle Dorsiflexion: 3+/5     Sensation  Overall Sensation Status: Impaired (BILATERAL GR TOE PROPRIOCEPTION SEVERELY IMPAIRED.)  Light Touch: Severe deficits in the RLE;Partial deficits in the LLE (Pt REPORTS L WORSE THAN R)  Bed mobility  Supine to Sit: Minimal assistance (Pt STRUGGLED, USED BEDRAIL HEAVILY)  Transfers  Sit to Stand: Minimal Assistance;2 Person Assistance  Stand to sit: Minimal Assistance  Bed to Chair: Minimal assistance  Stand Pivot Transfers: Minimal Assistance  Comment: STAND STEP TO CHAIR USING WALKER  Ambulation  Ambulation?: No     Balance  Sitting - Dynamic: Good;-  Standing - Dynamic: [address]  Times per week: AT LEAST 5-6  Current Treatment Recommendations: Strengthening, Balance Training, Functional Mobility Training, Transfer Training, Gait Training, Patient/Caregiver Education & Training, Safety Education & Training, Equipment Evaluation, Education, & procurement  Safety Devices  Type of devices:

## 2021-08-25 NOTE — PROGRESS NOTES
Speech Language Pathology  Facility/Department: [facility] SURG SERVICES  Speech/Language/Cognitive/Swallow Therapy     NAME: Linda Diane  : 1995   MRN: [id number]  ADMISSION DATE: 2021  ADMITTING DIAGNOSIS: has Seizure disorder ([facility] [address] and Multiple sclerosis ([facility] [address] on their problem list.    Date of Treat: 2021   Evaluating Therapist: TIAN Mejia    Assessment:  Monitored patient's speech/language/cognitive functioning. SLP continued to rank functional intelligibility of speech for unfamiliar listeners at 100% in utterances with background noise present. Patient still exhibited slow processing with delayed, but appropriate, auditory comprehension and verbalizations noted.     Also monitored patient's swallowing function. Patient continued to exhibit functional oral and pharyngeal stages of swallowing and no outward S/S penetration/aspiration was observed with any regular solid consistency presentation or thin liquid presentation administered during treatment session this date.     At this time, would recommend continuation regular solid consistency with thin liquids. Self-feed. Will continue to follow.     Goals:  Short-term Goals  Timeframe for Short-term Goals: 1-2x/day for 3 days  Goal 1: Patient will tolerate regular solid consistency and thin liquids with min S/S penetration/aspiration during PO intake. Goal 2: Patient staff will follow swallow safety recommendations to decrease risk of penetration/aspiration during PO intake. Goal 3: Monitor fedyjf-tqutyaov-xujsjghyf functioning.     Subjective:  Chart Reviewed: Yes  Patient assessed for rehabilitation services?: Yes  Family / Caregiver Present: No     Objective: Auditory Comprehension  Comprehension:  (Patient continued to demonstrate ability to answer simple yes/no questions regarding immediate environment and current state of being at independent level and with adequate processing speed.  Patient still demonstrated ability to follow simple 1 step commands independently and with adequate processing speed. While mildly delayed, patient continued to demonstrate ability to answer yes/no questions of increased complexity and to follow complex 1 and simple 2 step commands at independent level.)     Expression  Primary Mode of Expression:  (Confrontation naming of items in room was still considered to be [facility]. Structured responsive speech continued to be considered mildly delayed. Responses in natural conversation were still considered to be timely and appropriate.)     Motor Speech:  (SLP continued to rank functional intelligibility of speech for unfamiliar listeners at 100% in utterances with background noise present.)     Overall Orientation Status:  (Patient still demonstrated ability to verbalize all biographical, temporal, spatial, and situational information at independent level.)     Memory:  (Patient continued to demonstrate appropriate immediate memory with sequences of unrelated numbers/words set up to 5 items without repetitions provided. Patient still demonstrated appropriate short-term memory with 10 minute delay+distractions present.)     Problem Solving:  (Patient continued to demonstrate ability to verbalize appropriate solutions to situations that could occur during activities of daily living at independent level (ie. 911, acid reflux, burns, cuts, falls, fever, fire, headache).     Additional Assessment:   Monitored patient's swallowing function. With regular solid consistency presentations administered independently, patient exhibited adequate oral prep. Oral transit of regular solid consistency primarily measured 1-2 seconds in length and min oral cavity residue was noted post swallows; residue cleared from the mouth with additional dry swallows. Oral transit thin liquid presentations, administered independently via straw, primarily measured 1-2 seconds in length.  Laryngeal elevation during swallow initiation was considered to be functional in timing and strength and no S/S penetration/aspiration was observed with any regular solid consistency presentation or thin liquid presentation administered during treatment session this date.     At this time, would recommend continuation regular solid consistency with thin liquids. Self-feed. Will continue to follow.      Electronically signed by Sunday Landau, SLP on 8/25/2021 at 12:37 PM

## 2021-08-26 ENCOUNTER — APPOINTMENT (OUTPATIENT)
Dept: GENERAL RADIOLOGY | Age: 26
DRG: 347 | End: 2021-08-26
Attending: HOSPITALIST
Payer: MEDICAID

## 2021-08-26 LAB
ANA IGG, ELISA: NORMAL
ANION GAP SERPL CALCULATED.3IONS-SCNC: 10 MMOL/L (ref 7–19)
BUN BLDV-MCNC: 14 MG/DL (ref 6–20)
CALCIUM SERPL-MCNC: 9.2 MG/DL (ref 8.6–10)
CHLORIDE BLD-SCNC: 102 MMOL/L (ref 98–111)
CO2: 26 MMOL/L (ref 22–29)
CREAT SERPL-MCNC: 1.1 MG/DL (ref 0.5–0.9)
GFR AFRICAN AMERICAN: >59
GFR NON-AFRICAN AMERICAN: 59
GLUCOSE BLD-MCNC: 92 MG/DL (ref 74–109)
HCT VFR BLD CALC: 36.6 % (ref 37–47)
HEMOGLOBIN: 12.6 G/DL (ref 12–16)
MCH RBC QN AUTO: 33.2 PG (ref 27–31)
MCHC RBC AUTO-ENTMCNC: 34.4 G/DL (ref 33–37)
MCV RBC AUTO: 96.3 FL (ref 81–99)
PDW BLD-RTO: 12.6 % (ref 11.5–14.5)
PLATELET # BLD: 290 K/UL (ref 130–400)
PMV BLD AUTO: 9.4 FL (ref 9.4–12.3)
POTASSIUM REFLEX MAGNESIUM: 4.6 MMOL/L (ref 3.5–5)
RBC # BLD: 3.8 M/UL (ref 4.2–5.4)
SODIUM BLD-SCNC: 138 MMOL/L (ref 136–145)
WBC # BLD: 6.8 K/UL (ref 4.8–10.8)

## 2021-08-26 PROCEDURE — 97530 THERAPEUTIC ACTIVITIES: CPT

## 2021-08-26 PROCEDURE — 6370000000 HC RX 637 (ALT 250 FOR IP): Performed by: FAMILY MEDICINE

## 2021-08-26 PROCEDURE — 72120 X-RAY BEND ONLY L-S SPINE: CPT

## 2021-08-26 PROCEDURE — 85027 COMPLETE CBC AUTOMATED: CPT

## 2021-08-26 PROCEDURE — 94640 AIRWAY INHALATION TREATMENT: CPT

## 2021-08-26 PROCEDURE — 6360000002 HC RX W HCPCS: Performed by: INTERNAL MEDICINE

## 2021-08-26 PROCEDURE — 99233 SBSQ HOSP IP/OBS HIGH 50: CPT | Performed by: PSYCHIATRY & NEUROLOGY

## 2021-08-26 PROCEDURE — 36415 COLL VENOUS BLD VENIPUNCTURE: CPT

## 2021-08-26 PROCEDURE — 97535 SELF CARE MNGMENT TRAINING: CPT

## 2021-08-26 PROCEDURE — 6370000000 HC RX 637 (ALT 250 FOR IP): Performed by: PSYCHIATRY & NEUROLOGY

## 2021-08-26 PROCEDURE — 99254 IP/OBS CNSLTJ NEW/EST MOD 60: CPT | Performed by: NEUROLOGICAL SURGERY

## 2021-08-26 PROCEDURE — 2580000003 HC RX 258: Performed by: INTERNAL MEDICINE

## 2021-08-26 PROCEDURE — 6370000000 HC RX 637 (ALT 250 FOR IP): Performed by: INTERNAL MEDICINE

## 2021-08-26 PROCEDURE — 1210000000 HC MED SURG R&B

## 2021-08-26 PROCEDURE — 97116 GAIT TRAINING THERAPY: CPT

## 2021-08-26 PROCEDURE — 80048 BASIC METABOLIC PNL TOTAL CA: CPT

## 2021-08-26 PROCEDURE — 2500000003 HC RX 250 WO HCPCS: Performed by: INTERNAL MEDICINE

## 2021-08-26 RX ORDER — PREDNISONE 20 MG/1
40 TABLET ORAL DAILY
Status: COMPLETED | OUTPATIENT
Start: 2021-08-26 | End: 2021-08-26

## 2021-08-26 RX ORDER — PREDNISONE 20 MG/1
20 TABLET ORAL DAILY
Status: COMPLETED | OUTPATIENT
Start: 2021-08-27 | End: 2021-08-27

## 2021-08-26 RX ADMIN — ZOLPIDEM TARTRATE 10 MG: 5 TABLET ORAL at 20:50

## 2021-08-26 RX ADMIN — FAMOTIDINE 20 MG: 10 INJECTION, SOLUTION INTRAVENOUS at 09:29

## 2021-08-26 RX ADMIN — ARIPIPRAZOLE 2 MG: 2 TABLET ORAL at 09:28

## 2021-08-26 RX ADMIN — ATORVASTATIN CALCIUM 40 MG: 40 TABLET, FILM COATED ORAL at 09:28

## 2021-08-26 RX ADMIN — ESCITALOPRAM OXALATE 20 MG: 10 TABLET, FILM COATED ORAL at 09:29

## 2021-08-26 RX ADMIN — LEVETIRACETAM 500 MG: 500 TABLET ORAL at 20:49

## 2021-08-26 RX ADMIN — GABAPENTIN 600 MG: 600 TABLET, FILM COATED ORAL at 09:28

## 2021-08-26 RX ADMIN — BUDESONIDE 1000 MCG: 0.5 SUSPENSION RESPIRATORY (INHALATION) at 06:37

## 2021-08-26 RX ADMIN — CYCLOBENZAPRINE 10 MG: 10 TABLET, FILM COATED ORAL at 20:49

## 2021-08-26 RX ADMIN — PREGABALIN 75 MG: 75 CAPSULE ORAL at 20:49

## 2021-08-26 RX ADMIN — MELOXICAM 15 MG: 7.5 TABLET ORAL at 09:28

## 2021-08-26 RX ADMIN — ACETAMINOPHEN 650 MG: 325 TABLET ORAL at 03:15

## 2021-08-26 RX ADMIN — ALPRAZOLAM 1 MG: 1 TABLET ORAL at 03:15

## 2021-08-26 RX ADMIN — GABAPENTIN 600 MG: 600 TABLET, FILM COATED ORAL at 20:50

## 2021-08-26 RX ADMIN — FAMOTIDINE 20 MG: 10 INJECTION, SOLUTION INTRAVENOUS at 20:49

## 2021-08-26 RX ADMIN — SODIUM CHLORIDE, PRESERVATIVE FREE 10 ML: 5 INJECTION INTRAVENOUS at 20:50

## 2021-08-26 RX ADMIN — ACETAMINOPHEN 650 MG: 325 TABLET ORAL at 13:16

## 2021-08-26 RX ADMIN — LEVETIRACETAM 500 MG: 500 TABLET ORAL at 09:28

## 2021-08-26 RX ADMIN — LAMOTRIGINE 100 MG: 100 TABLET ORAL at 09:28

## 2021-08-26 RX ADMIN — ASPIRIN 81 MG: 81 TABLET, CHEWABLE ORAL at 09:28

## 2021-08-26 RX ADMIN — PREDNISONE 40 MG: 20 TABLET ORAL at 09:28

## 2021-08-26 RX ADMIN — BUDESONIDE 1000 MCG: 0.5 SUSPENSION RESPIRATORY (INHALATION) at 19:48

## 2021-08-26 RX ADMIN — GABAPENTIN 600 MG: 600 TABLET, FILM COATED ORAL at 13:16

## 2021-08-26 RX ADMIN — PREGABALIN 75 MG: 75 CAPSULE ORAL at 09:28

## 2021-08-26 RX ADMIN — ALPRAZOLAM 1 MG: 1 TABLET ORAL at 17:45

## 2021-08-26 RX ADMIN — CYCLOBENZAPRINE 10 MG: 10 TABLET, FILM COATED ORAL at 13:16

## 2021-08-26 RX ADMIN — ENOXAPARIN SODIUM 40 MG: 40 INJECTION SUBCUTANEOUS at 09:29

## 2021-08-26 RX ADMIN — SODIUM CHLORIDE, PRESERVATIVE FREE 10 ML: 5 INJECTION INTRAVENOUS at 09:29

## 2021-08-26 RX ADMIN — CYCLOBENZAPRINE 10 MG: 10 TABLET, FILM COATED ORAL at 09:28

## 2021-08-26 ASSESSMENT — PAIN DESCRIPTION - DESCRIPTORS
DESCRIPTORS: ACHING
DESCRIPTORS: SORE;ACHING;CONSTANT

## 2021-08-26 ASSESSMENT — PAIN SCALES - GENERAL
PAINLEVEL_OUTOF10: 3
PAINLEVEL_OUTOF10: 8
PAINLEVEL_OUTOF10: 4

## 2021-08-26 ASSESSMENT — PAIN DESCRIPTION - PROGRESSION: CLINICAL_PROGRESSION: NOT CHANGED

## 2021-08-26 ASSESSMENT — PAIN DESCRIPTION - PAIN TYPE
TYPE: CHRONIC PAIN
TYPE: ACUTE PAIN

## 2021-08-26 ASSESSMENT — PAIN DESCRIPTION - DIRECTION: RADIATING_TOWARDS: LEGS

## 2021-08-26 ASSESSMENT — PAIN DESCRIPTION - LOCATION
LOCATION: GENERALIZED
LOCATION: LEG

## 2021-08-26 ASSESSMENT — PAIN - FUNCTIONAL ASSESSMENT: PAIN_FUNCTIONAL_ASSESSMENT: PREVENTS OR INTERFERES SOME ACTIVE ACTIVITIES AND ADLS

## 2021-08-26 ASSESSMENT — PAIN DESCRIPTION - ONSET: ONSET: ON-GOING

## 2021-08-26 ASSESSMENT — PAIN DESCRIPTION - FREQUENCY: FREQUENCY: CONTINUOUS

## 2021-08-26 ASSESSMENT — PAIN DESCRIPTION - ORIENTATION: ORIENTATION: OTHER (COMMENT)

## 2021-08-26 NOTE — CARE COORDINATION
HH referral received. [place]. All agencies in patient's area contacted and do not [place].   Unable to set up [facility] Electronically signed by Troy Mcqueen on 8/26/21 at 9:26 AM CDT

## 2021-08-26 NOTE — CARE COORDINATION
SW met with pt at bedside re: dc plans. Informed pt that she was denied from [facility] and inpatient rehab due to her insurance. Pt stated she has support from her girlfriend who can help her get up the flights of stairs to her apartment. Informed pt about [address] [place] [address] Bed. Pt was agreeable. Pt aware she needs more therapy before returning home due to the flights of stairs at apartment complex. Referral was sent.    [address] [place] [address]  [phone] P   F  Electronically signed by Mai Figueroa on 8/26/2021 at 10:31 AM

## 2021-08-26 NOTE — CARE COORDINATION
The [address] [facility]  Notification of Admission Decision      [] Patient has been accepted for admit to [facility] on :       Please write discharge orders and summary prior to discharge. [] Patient acceptance to Rehab pending the following :    [] Eval in progress       [x] Patient determined to be ineligible for services at [facility] because : [place]        We recommend you consider  IRF or SNF in [place]       Thank you for your referral, we appreciate you. If you have any questions, please feel   free to contact me at [phone].

## 2021-08-26 NOTE — PROGRESS NOTES
Occupational Therapy  Facility/Department: [facility] SURG SERVICES  Daily Treatment Note  NAME: Leilani Oliva  : 1995  MRN: [id number]    Date of Service: 2021    Discharge Recommendations:  Patient would benefit from continued therapy after discharge, 24 hour supervision or assist  OT Equipment Recommendations  Other: 20 inch wheelchair with swing away footrests, 20inch wheelchair cushion    Assessment   Assessment: Tx initiated today. Pt was very tired following her bath with the PCA and the nurse. Pt has weakness and stairs in the home. Pt needs a WC for home and a Shower chair. Treatment Diagnosis: Seizure Disorder, possible MS, possible recent stroke  Prognosis: Good  Decision Making: Medium Complexity  Activity Tolerance  Activity Tolerance: Pt limited in activity due to fatigue. Patient Diagnosis(es): There were no encounter diagnoses. has a past medical history of Asthma, Cerebral artery occlusion with cerebral infarction [facility]), Movement disorder, Psychiatric problem, Seizures ([address] and Tachycardia. has a past surgical history that includes  section (2016); Hysterectomy; and Insertable Cardiac Monitor (2021). Restrictions  Restrictions/Precautions  Restrictions/Precautions: Fall Risk  Subjective   General  Chart Reviewed: Yes  Patient assessed for rehabilitation services?: Yes  Family / Caregiver Present: No  Referring Practitioner: Jaimie Poe DO  Diagnosis: Spinal stenosis of lumbar region with neurogenic claudication  General Comment  Comments: Pt limited in her therapy due to fatigue.   Pain Assessment  Pain Assessment: 0-10  Pain Level: 4  Pain Type: Acute pain  Pain Location: Leg  Pain Radiating Towards: legs  Pain Descriptors: Aching  Non-Pharmaceutical Pain Intervention(s): Repositioned  Vital Signs  Patient Currently in Pain: Yes   Orientation  Orientation  Overall Orientation Status: Within Normal Limits  Objective    ADL  Feeding:

## 2021-08-26 NOTE — PROGRESS NOTES
Physical Therapy  Andria Pastrana  [id number]     08/26/21 1056   Subjective   Subjective Agrees to work with therapy. Bed Mobility   Supine to Sit Minimal assistance   Sit to Supine Moderate assistance  (assist with BLE)   Transfers   Sit to Stand Minimal Assistance;2 Person Assistance   Stand to sit Minimal Assistance   Ambulation   Ambulation? Yes   Ambulation 1   Surface level tile   Device Standard Walker   Assistance Minimal assistance;2 Person assistance   Distance 5'x2   Comments patient only able to take a few steps forward and back to bed   Other Activities   Comment Patient states being light headed upon standing and became dizzy after taking a few steps. Patient returned to bed, requiring assist with BLE to transfer sit to supine, positioned for comfort with all needs in reach. Short term goals   Time Frame for Short term goals 14 DAYS   Short term goal 1 BED MOB MOD IND   Short term goal 2 TRANSFERS SBA   Short term goal 3 ' RW SBA   Activity Tolerance   Activity Tolerance Patient Tolerated treatment well   Safety Devices   Type of devices Bed alarm in place;Call light within reach; Left in bed   Electronically signed by Manuel Lagunas PTA on 8/26/2021 at 10:59 AM

## 2021-08-26 NOTE — PROGRESS NOTES
[id number] [facility] Neurology Progress Note      Patient:   Arlyn Slaughter  MR#:    [id number]   Room:    [address] [place] [address]   YOB: 1995  Date of Progress Note: 8/26/2021  Time of Note                           7:41 AM  Consulting Physician:  Jaqueline Pedro DO  Attending Physician:  José Zarco DO      INTERVAL HISTORY:  Doing about the same overnight, no new complaints, lower extremity weakness is unchanged, no overt seizures, noting some tremulousness overnight. REVIEW OF SYSTEMS:  Constitutional: No fevers No chills  Neck:No stiffness  Respiratory: No shortness of breath  Cardiovascular: No chest pain No palpitations  Gastrointestinal: No abdominal pain    Genitourinary: No Dysuria  Neurological: No headache, no confusion    PHYSICAL EXAM:    Constitutional -   /72   Pulse 77   Temp 96 °F (35.6 °C) (Temporal)   Resp 16   Ht 5' 6\" (1.676 m)   Wt 226 lb (102.5 kg)   SpO2 98%   BMI 36.48 kg/m²   General appearance: No acute distress   EYES -   Conjunctiva normal  Pupillary exam as below, see CN exam in the neurologic exam  ENT-    No scars, masses, or lesions over external nose or ears  Hearing normal bilaterally to finger rub  Neck-   No neck masses noted  Thyroid normal   No jugular vein distension  Cardiovascular -   No clubbing, cyanosis, or edema   Pulmonary-   Good expansion, normal effort without use of accessory muscles  Musculoskeletal -   No significant wasting of muscles noted  Gait as below, see gait exam in the neurologic exam  Muscle strength, tone, stability as below see the motor exam in the neurologic exam.   No bony deformities  Skin -   Warm, dry, and intact to inspection and palpation. No rash, erythema, or pallor  Psychiatric -   Mood, affect, and behavior appear normal    Memory as below see mental status examination in the neurologic exam    NEUROLOGICAL EXAM     Mental status    [x]? Awake, alert, oriented   [x]?  Affect attention and concentration appear appropriate  [x]? Recent and remote memory appears unremarkable  [x]? Speech normal without dysarthria or aphasia, comprehension and repetition intact. COMMENTS:   Cranial Nerves [x]? No VF deficit to confrontation  [x]? PERRLA, EOMI, no nystagmus, conjugate eye movements, no ptosis  [x]? Face symmetric  [x]? Facial sensation intact  [x]? Tongue midline no atrophy or fasciculations present  [x]? Palate midline, hearing to finger rub normal  [x]? Shoulder shrug and SCM testing normal  COMMENTS:   Motor   []? 5/5 strength x 4 extremities  [x]? Normal bulk and tone  [x]? No tremor present  [x]? No rigidity or bradykinesia noted  COMMENTS: 4/5 BLE, weaker over the right distally    Sensory  []? Sensation intact to light touch, pin prick, vibration, and proprioception BLE  []? Sensation intact to light touch, pin prick, vibration, and proprioception BUE  COMMENTS: Decreased LT, PP, Vib BLE   Coordination [x]? FTN normal bilaterally   []? HTS normal bilaterally  []? LEILA normal.   COMMENTS:   Reflexes  []? Symmetric and non-pathological  [x]? Toes downgoing bilaterally  [x]? No clonus present  COMMENTS: Diminshed patellar reflex over the right side    Gait                  []? Normal steady gait    []? Ataxic    []? Spastic     []? Magnetic     []? Shuffling  [x]? Not assessed  COMMENTS:        LABS/IMAGING:    MRI CERVICAL SPINE W WO CONTRAST    Result Date: 8/25/2021  MRI CERVICAL SPINE W WO CONTRAST 8/24/2021 3:40 PM HISTORY: Seizure, movement disorder COMPARISON: None TECHNIQUE: Multiplanar, multisequence MRI of the cervical spine was obtained with and without contrast. 20 mL MultiHance IV contrast. FINDINGS: The cervical spine is imaged from the posterior fossa through T2. Imaged portions of the cerebellum and brainstem are unremarkable. Normal alignment across the craniocervical junction. Alignment: Flattened lordosis. There is no evidence of listhesis or subluxation.  Marrow signal: No pathologic marrow infiltrate is demonstrated. The vertebral body heights and posterior elements are maintained. Cord: The spinal cord is normal in signal and morphology. No short segment lesions or pathologic enhancement. Soft tissues: The surrounding soft tissues are unremarkable. Levels: C2-C3: No disc herniation or significant disc bulge. No significant spinal stenosis. No significant neuroforaminal narrowing. C3-C4: No disc herniation or significant disc bulge. No significant spinal stenosis. No significant neuroforaminal narrowing. C4-C5: No disc herniation or significant disc bulge. No significant spinal stenosis. No significant neuroforaminal narrowing. C5-C6: No disc herniation or significant disc bulge. No significant spinal stenosis. No significant neuroforaminal narrowing. C6-C7: No disc herniation or significant disc bulge. No significant spinal stenosis. No significant neuroforaminal narrowing. C7-T1: No disc herniation or significant disc bulge. No significant spinal stenosis. Left-sided facet arthropathy with mild neuroforaminal narrowing. 1. Cervical cord is unremarkable. No short segment lesions and/or enhancing lesions. 2. Flattened lordosis. Signed by Dr Kirk Nihcole    [facility] 12    Result Date: 8/25/2021  MRI THORACIC SPINE W WO CONTRAST 8/24/2021 3:41 PM HISTORY: Seizure, movement disorder COMPARISON : NONE Technique: Multiplanar, multisequence MRI of the thoracic spine was obtained with and without the use of contrast. 20 mL MultiHance IV contrast. Findings: The thoracic spine maintains appropriate anatomic alignment and demonstrates no significant morphologic abnormalities. The thoracic vertebral bodies and intervertebral disc spaces demonstrate appropriate signal intensity and morphology. The thoracic spinal cord is normal in signal intensity and morphology. There is no evidence of significant disc bulge or protrusion.  There is no demonstration of spinal stenosis, neural foraminal narrowing, or pars defects. The surrounding paraspinous soft tissues are grossly unremarkable. No pathologic enhancement. Impression: 1. Unremarkable MRI of the Thoracic spine. 2. Thoracic cord is unremarkable. No short segment lesions or pathologic enhancement. Signed by Dr Tom Bowman    [facility]    Result Date: 8/25/2021  MRI LUMBAR SPINE W WO CONTRAST 8/24/2021 3:41 PM HISTORY: Seizure, movement disorder, low back pain, weakness COMPARISON: None TECHNIQUE: Multiplanar, multisequence MRI of the lumbar spine was performed with and without the use of contrast. 20 mL MultiHance IV contrast. FINDINGS: The lumbar spine is visualized from T11 through S2. There are presumed to be 5 lumbar-type vertebrae, with the most inferior being labeled as L5. Alignment: Lumbar lordosis is maintained. There is a grade 1 lytic spondylolisthesis at L5-S1. Marrow signal: No pathologic marrow infiltrate is demonstrated. Vertebral body heights are well-maintained. Cord: The conus medullaris terminates at the level of L1. The visualized spinal cord is normal in signal and morphology. Soft tissues: Left kidney appears completely atrophic. Levels: L1-L2: No disc herniation or significant disc bulge. No significant spinal stenosis. No significant neuroforaminal narrowing. L2-L3: No disc herniation or significant disc bulge. No significant spinal stenosis. No significant neuroforaminal narrowing. L3-L4: No disc herniation or significant disc bulge. No significant spinal stenosis. No significant neuroforaminal narrowing. L4-L5: No disc herniation or significant disc bulge. No significant spinal stenosis. No significant neuroforaminal narrowing. L5-S1: Disc desiccation with loss of disc height. No significant spinal stenosis. Severe right-sided and moderate left-sided neuroforaminal narrowing as result of the lytic spondylolisthesis.     1. Grade 1 lytic spondylolisthesis at L5-S1 resulting in high-grade bilateral neuroforaminal narrowing. 2. No significant spinal stenosis. 3. Left kidney is completely atrophic. Signed by Dr Joshua Valdez    [facility]    Result Date: 8/25/2021  MRI BRAIN W WO CONTRAST 8/24/2021 3:25 PM HISTORY: Seizure, movement disorder Comparison: None. Technique: Multiplanar imaging of the brain was performed in a routine fashion before and after the intravenous injection of gadolinium contrast. 20 mL MultiHance IV contrast. Findings: There is no diffusion signal abnormality to suggest acute infarct. There is no intra-axial or extra-axial hemorrhage. No visualized mass lesion, white matter FLAIR abnormalities or pathologic enhancement. Normal size and configuration of the ventricular system for patient age. The posterior fossa structures are unremarkable. The pituitary gland and sella are unremarkable. The major intracranial flow voids are preserved. The orbits are unremarkable. The paranasal sinuses and mastoids are clear. Marrow signal in the calvarium and skull base appears normal.    Impression: 1. Unremarkable MRI examination of the brain. 2. No acute process identified. 3. There are no white matter FLAIR abnormalities or enhancing abnormalities to suggest underlying acute or chronic demyelination.  Signed by Dr Joshua Valdez      Lab Results   Component Value Date    WBC 7.3 08/24/2021    HGB 11.6 (L) 08/24/2021    HCT 35.0 (L) 08/24/2021    MCV 95.4 08/24/2021     08/24/2021     Lab Results   Component Value Date     08/24/2021    K 3.8 08/24/2021     08/24/2021    CO2 21 (L) 08/24/2021    BUN 16 08/24/2021    CREATININE 1.2 (H) 08/24/2021    GLUCOSE 152 (H) 08/24/2021    CALCIUM 8.3 (L) 08/24/2021    PROT 6.1 (L) 08/24/2021    LABALBU 3.7 08/24/2021    BILITOT 0.5 08/24/2021    ALKPHOS 81 08/24/2021    AST 24 08/24/2021    ALT 19 08/24/2021    LABGLOM 54 (A) 08/24/2021    GFRAA >59 08/24/2021   No results found for: INR, PROTIME    RECORD REVIEW:   Previous medical records, medications were reviewed at today's visit. Nursing/physician notes, imaging, labs and vitals reviewed. PT,OT and/or speech notes reviewed      ASSESSMENT:  31 y.o. admitted with new onset seizure, questionably recently diagnosed with MS, questionable recent stroke history,  chronic back pain, anxiety. MRI brain negative for demyelinating lesions or evidence of new or prior stroke. Do not feel the patient has MS, stroke felt unlikely as well. MRI C/T/L spine with ddd, moderate to severe nf narrowing at L5-S1. No overt cord lesions or spinal stenosis noted. Labs largely negative from a weakness standpoint. No further overt seizure activity, EEG normal.  Exam stable overnight, still with lower extremity weakness, R>L unclear if effort related, question if the nf narrowing may be playing a role.      PLAN:  1. Continue Lamictal will cover possible seizure and help with mood stabilization. Continue Keppra 500 mg bid. 2.  Neurosurgery consulted and do not feel she is a urgent surgical candidate. Recommended PT and follow up with her regular neurosurgeon as an outpatient. 3.  NCS/EMG felt low yield for GBS given exam, myopathy felt unlikely as well - CPK normal.    4.  LP felt low yield given normal MRI. 5.  PT, OT  6. DVT proph       Please feel free to call with any questions. [phone] (cell phone).     Kay Adams DO  Board Certified Neurology

## 2021-08-26 NOTE — CARE COORDINATION
SW met with pt at bedside to inform her that [facility] cannot accept. Pt is agreeable to have referral sent to [facility].  SW will send referral.     [address] [place] [id number] [phone]   [phone] F  Electronically signed by Misa Rivera on 8/26/2021 at 12:05 PM

## 2021-08-26 NOTE — PROGRESS NOTES
Hospitalist Progress Note  8/26/2021 3:35 PM  Subjective:   Admit Date: 8/24/2021  PCP: No primary care provider on file. Chief Complaint: weakness, pain    Subjective: Continued weakness, worked with PT some but still not safe to discharge to home. Placement pending but will likely require precertification. History is otherwise unchanged. [facility]:   8-24: Received in transfer from OSH with weakness and pain, suspected seizure or MS flare. Diagnosed with MS, stroke, and spondylolisthesis, have been discussing back surgery for about a year. Multiple falls at home due to weakness, unsteady gait, tremors. Admitted to med/surg with neurology consult. 8-25: Workup has been unrevealing, low likelihood of MS, stroke, or seizure disorder. The patient does have spinal stenosis so neurosurgery was consulted. Disease would not explain her constellation of symptoms, feels outpatient follow up with her neurosurgeon is warranted. She did poorly with PT and there are concerns about discharging her to a second story apartment, so will convert to inpatient to keep for reevaluation tomorrow. May need placement for PT/OT. 8-26: Pending placement with precert. Working with PT and doing better but still unable to get in or out of second story apartment. ROS: 14 point review of systems is negative except as specifically addressed above. ADULT DIET; Regular;  No swiss cheese    Intake/Output Summary (Last 24 hours) at 8/26/2021 1535  Last data filed at 8/26/2021 1354  Gross per 24 hour   Intake 1080 ml   Output 3275 ml   Net -2195 ml     Medications:   sodium chloride       Current Facility-Administered Medications   Medication Dose Route Frequency Provider Last Rate Last Admin    [START ON 8/27/2021] predniSONE (DELTASONE) tablet 20 mg  20 mg Oral Daily Hal Guillaume DO        ALPRAZolam Theodore Nasuti) tablet 1 mg  1 mg Oral 4x Daily PRN Tiffany Esqueda MD   1 mg at 08/26/21 0315    ARIPiprazole lympadenopathy, bruit, thyroid normal  Lungs: no increased work of breathing, \"clear to auscultation bilaterally\" without rales, rhonchi or wheezes  Heart: regular rate and rhythm, S1, S2 normal, no murmur, click, rub or gallop  Abdomen: soft, non-tender; bowel sounds normal; no masses,  no organomegaly and obese  Extremities: extremities normal, atraumatic, no cyanosis or edema  Neurologic: no focal neurologic deficits, normal sensation, alert and oriented, affect and mood appropriate  Skin: no rashes, nodules    Assessment and Plan:   Principal Problem:    Spinal stenosis of lumbar region with neurogenic claudication  Active Problems:    Seizure disorder ([facility] [address]    Multiple sclerosis ([facility] [address]  Resolved Problems:    * No resolved hospital problems. *      PT and OT. Seeking placement as patient is not safe to discharge to home. Unable to get in or out of second story apartment at present. Workup unrevealing, MS, seizure disorder, and stroke all felt unlikely.     Advance Directive: Full Code    DVT prophylaxis: enoxaparin    Discharge planning: DO Deyanira Andre Hospitalist

## 2021-08-26 NOTE — CONSULTS
[facility]  Consult Note    CHIEF COMPLAINT:  Seizure    HISTORY OF PRESENT ILLNESS:      This is a 31 y.o. female who was admitted with new onset seizure. The patient has a complicated past medical history which includes a possible recent diagnosis of MS, possible recent stroke, new onset seizure, and chronic back pain. As far as her back pain goes she states she has had back pain for many months. She has been evaluated by Dr. Maximo Meckel at [facility] and states that he was recommending she move forward with a fusion procedure. She states that her entire legs are numbness from the waist down. She complains of leg pain but its \"in the front and back\" and doesn't follow any real dermatomal pattern. The patient does not take anticoagulation medication.        Past Medical History:   Diagnosis Date    Asthma     Cerebral artery occlusion with cerebral infarction ([facility] [address]     2021    Movement disorder     Psychiatric problem     Seizures ([facility] [address]     Tachycardia 2021       Past Surgical History:   Procedure Laterality Date     SECTION  2016    HYSTERECTOMY      2018    INSERTABLE CARDIAC MONITOR  2021        Medications    Current Facility-Administered Medications:     predniSONE (DELTASONE) tablet 40 mg, 40 mg, Oral, Daily **FOLLOWED BY** [START ON 2021] predniSONE (DELTASONE) tablet 20 mg, 20 mg, Oral, Daily, Antonio Flynn DO    ALPRAZolam Maximo Ket) tablet 1 mg, 1 mg, Oral, 4x Daily PRN, Lamont Stock MD, 1 mg at 21 0315    ARIPiprazole (ABILIFY) tablet 2 mg, 2 mg, Oral, Daily, Lamont Stock MD, 2 mg at 21 8885    aspirin chewable tablet 81 mg, 81 mg, Oral, Daily, Lamont Stock MD, 81 mg at 21 0923    atorvastatin (LIPITOR) tablet 40 mg, 40 mg, Oral, Daily, Lamont Stock MD, 40 mg at 21 09    budesonide (PULMICORT) nebulizer suspension 1,000 mcg, 1 mg, Nebulization, BID, Katty Cruz, MD, 1,000 mcg at 08/26/21 9389    cyclobenzaprine (FLEXERIL) tablet 10 mg, 10 mg, Oral, TID, Missael Mata MD, 10 mg at 08/25/21 2110    escitalopram (LEXAPRO) tablet 20 mg, 20 mg, Oral, Daily, Missael Mata MD, 20 mg at 08/25/21 7401    gabapentin (NEURONTIN) tablet 600 mg, 600 mg, Oral, TID, Missael Mata MD, 600 mg at 08/25/21 2110    lamoTRIgine (LAMICTAL) tablet 100 mg, 100 mg, Oral, Daily, Missael Mata MD, 100 mg at 08/25/21 0198    meloxicam (MOBIC) tablet 15 mg, 15 mg, Oral, Daily, Missael Mata MD, 15 mg at 08/25/21 1832    pregabalin (LYRICA) capsule 75 mg, 75 mg, Oral, BID, Missael Mata MD, 75 mg at 08/25/21 2110    zolpidem (AMBIEN) tablet 10 mg, 10 mg, Oral, Nightly, Missael Mata MD, 10 mg at 08/25/21 2111    0.9 % sodium chloride bolus, 500 mL, IntraVENous, Once, Missael Mata MD    sodium chloride flush 0.9 % injection 5-40 mL, 5-40 mL, IntraVENous, 2 times per day, Missael Mata MD, 10 mL at 08/25/21 2111    sodium chloride flush 0.9 % injection 5-40 mL, 5-40 mL, IntraVENous, PRN, Missael Mata MD    0.9 % sodium chloride infusion, 25 mL, IntraVENous, PRN, Missael Mata MD    enoxaparin (LOVENOX) injection 40 mg, 40 mg, Subcutaneous, Daily, Missael Mata MD, 40 mg at 08/25/21 0924    ondansetron (ZOFRAN-ODT) disintegrating tablet 4 mg, 4 mg, Oral, Q8H PRN **OR** ondansetron (ZOFRAN) injection 4 mg, 4 mg, IntraVENous, Q6H PRN, Missael Mata MD, 4 mg at 08/24/21 1221    polyethylene glycol (GLYCOLAX) packet 17 g, 17 g, Oral, Daily PRN, Missael Mata MD    acetaminophen (TYLENOL) tablet 650 mg, 650 mg, Oral, Q6H PRN, 650 mg at 08/26/21 0315 **OR** acetaminophen (TYLENOL) suppository 650 mg, 650 mg, Rectal, Q6H PRN, Missael Mata MD    famotidine (PEPCID) injection 20 mg, 20 mg, IntraVENous, BID, Missael Mata MD, 20 mg at 08/25/21 2111    levETIRAcetam (KEPPRA) tablet 500 mg, 500 mg, Oral, BID, Selestino Been, DO, 500 mg at 08/25/21 2111  Patient has no active allergies. Social History  Social History     Tobacco Use   Smoking Status Never Smoker   Smokeless Tobacco Never Used     Social History     Substance and Sexual Activity   Alcohol Use Never         Family History   Problem Relation Age of Onset    No Known Problems Mother     Cancer Father     High Cholesterol Father     No Known Problems Sister     No Known Problems Brother     No Known Problems Maternal Grandmother     No Known Problems Maternal Grandfather     Heart Disease Paternal Grandmother     Diabetes Paternal Grandmother     No Known Problems Paternal Grandfather     Other Child     Other Child     No Known Problems Sister          REVIEW OF SYSTEMS:  Constitutional: No fevers No chills  Neck:No stiffness  Respiratory: No shortness of breath  Cardiovascular: No chest pain No palpitations  Gastrointestinal: No abdominal pain    Genitourinary: No Dysuria  Neurological: No headache, no confusion    PHYSICAL EXAM:  Vitals:    08/26/21 0643   BP: 101/72   Pulse: 77   Resp: 16   Temp: 96 °F (35.6 °C)   SpO2: 98%       Constitutional: The patient appears well-developed and well-nourished. Eyes - conjunctiva normal.  Conjugate Gaze  Ear, nose, throat - No scars, masses, or lesions over external nose or ears, no atrophy of tongue  Neck-symmetric, no masses noted, no jugular vein distension  Respiration- chest wall appears symmetric, good expansion, normal effort without use of accessory muscles  Musculoskeletal - no significant wasting of muscles noted, no bony deformities  Extremities-no clubbing, cyanosis or edema  Skin - warm, dry, and intact. No rash, erythema, or pallor.   Psychiatric - mood, affect, and behavior appear normal.     Neurologic Examination  Awake, Alert and oriented x 4  Normal speech pattern, following commands  Motor 5/5 all extremities, some mild pain limited to weakness in lower extremities 4/5 bilaterally, poor participation  No deficits to light touch or pinprick sensation  Reflexes are 2+ and symmetric, riight patellar reflex is slighly diministhed. DATA:  Nursing/pcp notes, imaging,labs and vitals reviewed. PT,OT and/or speech notes reviewed    Lab Results   Component Value Date    WBC 7.3 08/24/2021    HGB 11.6 (L) 08/24/2021    HCT 35.0 (L) 08/24/2021    MCV 95.4 08/24/2021     08/24/2021     Lab Results   Component Value Date     08/24/2021    K 3.8 08/24/2021     08/24/2021    CO2 21 (L) 08/24/2021    BUN 16 08/24/2021    CREATININE 1.2 (H) 08/24/2021    GLUCOSE 152 (H) 08/24/2021    CALCIUM 8.3 (L) 08/24/2021    PROT 6.1 (L) 08/24/2021    LABALBU 3.7 08/24/2021    BILITOT 0.5 08/24/2021    ALKPHOS 81 08/24/2021    AST 24 08/24/2021    ALT 19 08/24/2021    LABGLOM 54 (A) 08/24/2021    GFRAA >59 08/24/2021   No results found for: INR, PROTIME    MRI BRAIN W WO CONTRAST 8/24/2021 3:25 PM   HISTORY: Seizure, movement disorder   Comparison: None.     Technique: Multiplanar imaging of the brain was performed in a routine   fashion before and after the intravenous injection of gadolinium   contrast. 20 mL MultiHance IV contrast.   Findings: There is no diffusion signal abnormality to suggest acute infarct. There is no intra-axial or extra-axial hemorrhage. No visualized mass   lesion, white matter FLAIR abnormalities or pathologic enhancement. Normal size and configuration of the ventricular system for patient   age. The posterior fossa structures are unremarkable. The pituitary   gland and sella are unremarkable. The major intracranial flow voids   are preserved. The orbits are unremarkable. The paranasal sinuses and mastoids are   clear. Marrow signal in the calvarium and skull base appears normal.       Impression   Impression:    1. Unremarkable MRI examination of the brain.     2. No acute process identified. 3. There are no white matter FLAIR abnormalities or enhancing   abnormalities to suggest underlying acute or chronic demyelination. Signed by Dr Felice Valdivia     I have personally reviewed the images and my interpretation is:  No evidence of acute ischemia. No sign of old infarct as well. No signal change on FLAIR or evidence of white matter disease. No HCP extra-axial fluid collections . MRI CERVICAL SPINE W WO CONTRAST 8/24/2021 3:40 PM   HISTORY: Seizure, movement disorder   COMPARISON: None    TECHNIQUE: Multiplanar, multisequence MRI of the cervical spine was   obtained with and without contrast. 20 mL MultiHance IV contrast.   FINDINGS:    The cervical spine is imaged from the posterior fossa through T2. Imaged portions of the cerebellum and brainstem are unremarkable. Normal alignment across the craniocervical junction. Alignment: Flattened lordosis. There is no evidence of listhesis or   subluxation. Marrow signal: No pathologic marrow infiltrate is demonstrated. The   vertebral body heights and posterior elements are maintained. Cord: The spinal cord is normal in signal and morphology. No short   segment lesions or pathologic enhancement. Soft tissues: The surrounding soft tissues are unremarkable. Levels:   C2-C3: No disc herniation or significant disc bulge. No significant   spinal stenosis. No significant neuroforaminal narrowing. C3-C4: No disc herniation or significant disc bulge. No significant   spinal stenosis. No significant neuroforaminal narrowing. C4-C5: No disc herniation or significant disc bulge. No significant   spinal stenosis. No significant neuroforaminal narrowing. C5-C6: No disc herniation or significant disc bulge. No significant   spinal stenosis. No significant neuroforaminal narrowing. C6-C7: No disc herniation or significant disc bulge. No significant   spinal stenosis. No significant neuroforaminal narrowing.    C7-T1: No disc herniation or significant disc bulge. No significant   spinal stenosis. Left-sided facet arthropathy with mild neuroforaminal   narrowing.       Impression   1. Cervical cord is unremarkable. No short segment lesions and/or   enhancing lesions. 2. Flattened lordosis. Signed by Dr Shanell Perry     I have personally reviewed the images and my interpretation is:  Some straightening of the cervical spine  No evidence of stenosis       MRI THORACIC SPINE W WO CONTRAST 8/24/2021 3:41 PM   HISTORY: Seizure, movement disorder   COMPARISON : NONE    Technique: Multiplanar, multisequence MRI of the thoracic spine was   obtained with and without the use of contrast. 20 mL MultiHance IV   contrast.   Findings: The thoracic spine maintains appropriate anatomic alignment and   demonstrates no significant morphologic abnormalities. The thoracic vertebral bodies and intervertebral disc spaces   demonstrate appropriate signal intensity and morphology. The thoracic   spinal cord is normal in signal intensity and morphology. There is no evidence of significant disc bulge or protrusion. There is   no demonstration of spinal stenosis, neural foraminal narrowing, or   pars defects. The surrounding paraspinous soft tissues are grossly unremarkable. No   pathologic enhancement.       Impression   Impression:    1. Unremarkable MRI of the Thoracic spine. 2. Thoracic cord is unremarkable. No short segment lesions or   pathologic enhancement. Signed by Dr Shanell Perry     I have personally reviewed the images and my interpretation is:  No evidence of any stenosis or cord compression in the thoracic spine.   No myelopathy.,    MRI LUMBAR SPINE W WO CONTRAST 8/24/2021 3:41 PM   HISTORY: Seizure, movement disorder, low back pain, weakness   COMPARISON: None    TECHNIQUE: Multiplanar, multisequence MRI of the lumbar spine was   performed with and without the use of contrast. 20 mL MultiHance IV   contrast.   FINDINGS: The lumbar spine is visualized from T11 through S2. There are presumed   to be 5 lumbar-type vertebrae, with the most inferior being labeled as   L5. Alignment: Lumbar lordosis is maintained. There is a grade 1 lytic   spondylolisthesis at L5-S1. Marrow signal: No pathologic marrow infiltrate is demonstrated. Vertebral body heights are well-maintained. Cord: The conus medullaris terminates at the level of L1. The   visualized spinal cord is normal in signal and morphology. Soft tissues: Left kidney appears completely atrophic. Levels:   L1-L2: No disc herniation or significant disc bulge. No significant   spinal stenosis. No significant neuroforaminal narrowing. L2-L3: No disc herniation or significant disc bulge. No significant   spinal stenosis. No significant neuroforaminal narrowing. L3-L4: No disc herniation or significant disc bulge. No significant   spinal stenosis. No significant neuroforaminal narrowing. L4-L5: No disc herniation or significant disc bulge. No significant   spinal stenosis. No significant neuroforaminal narrowing. L5-S1: Disc desiccation with loss of disc height. No significant   spinal stenosis. Severe right-sided and moderate left-sided   neuroforaminal narrowing as result of the lytic spondylolisthesis.       Impression   1. Grade 1 lytic spondylolisthesis at L5-S1 resulting in high-grade   bilateral neuroforaminal narrowing. 2. No significant spinal stenosis. 3. Left kidney is completely atrophic. Signed by Dr Krysten Reaves     I have personally reviewed the images and my interpretation is:   There is DDD at L5-S1 and small spondylolisthesis  There is moderate to severe right foraminal stenosis and mild left foraminal stenosis    IMPRESSION  31year old female previously diagnosed with MS and stroke who presented with seizure with chronic low back pain, numbness and weakness    RECOMMENDATIONS:    Miss Kevin's complains of numbness within that entire lower extremities. She does not describe a clear radicular pain pattern into her legs. Upon exam she has has mild 4/5 weakness in dorsiflexion, planter flexion and hip flexion bilaterally that appears effort related. Her MRI has revealed moderate to severe stenosis on the right and a small spondylolisthesis while the rest of the imaging of the neuroaxis was normal.       Overall, Miss Kevin's symptoms and exam findings do not correlate with unilateral foraminal stenosis. She states she is considering undergoing a fusion by Dr. Alton Palomino which I explained to her that I do NOT recommend she have any surgery in her lumbar spine. I am going to obtain a flexion and extension x-ray to make certain there is no gross instability at L5-S1.   If that does not reveal any significant motion I explained that surgery would NOT be in her best interest.                       Melida Mak, DO

## 2021-08-27 VITALS
SYSTOLIC BLOOD PRESSURE: 108 MMHG | WEIGHT: 226 LBS | RESPIRATION RATE: 18 BRPM | HEART RATE: 114 BPM | TEMPERATURE: 97.1 F | DIASTOLIC BLOOD PRESSURE: 76 MMHG | BODY MASS INDEX: 36.32 KG/M2 | HEIGHT: 66 IN | OXYGEN SATURATION: 97 %

## 2021-08-27 LAB
ANION GAP SERPL CALCULATED.3IONS-SCNC: 12 MMOL/L (ref 7–19)
BUN BLDV-MCNC: 11 MG/DL (ref 6–20)
CALCIUM SERPL-MCNC: 9.5 MG/DL (ref 8.6–10)
CHLORIDE BLD-SCNC: 104 MMOL/L (ref 98–111)
CO2: 21 MMOL/L (ref 22–29)
CREAT SERPL-MCNC: 1 MG/DL (ref 0.5–0.9)
GFR AFRICAN AMERICAN: >59
GFR NON-AFRICAN AMERICAN: >60
GLUCOSE BLD-MCNC: 115 MG/DL (ref 74–109)
HCT VFR BLD CALC: 36.8 % (ref 37–47)
HEMOGLOBIN: 12.4 G/DL (ref 12–16)
MCH RBC QN AUTO: 32 PG (ref 27–31)
MCHC RBC AUTO-ENTMCNC: 33.7 G/DL (ref 33–37)
MCV RBC AUTO: 95.1 FL (ref 81–99)
PDW BLD-RTO: 12.5 % (ref 11.5–14.5)
PLATELET # BLD: 297 K/UL (ref 130–400)
PMV BLD AUTO: 8.8 FL (ref 9.4–12.3)
POTASSIUM REFLEX MAGNESIUM: 4.5 MMOL/L (ref 3.5–5)
RBC # BLD: 3.87 M/UL (ref 4.2–5.4)
SODIUM BLD-SCNC: 137 MMOL/L (ref 136–145)
WBC # BLD: 11.7 K/UL (ref 4.8–10.8)

## 2021-08-27 PROCEDURE — 99253 IP/OBS CNSLTJ NEW/EST LOW 45: CPT | Performed by: PSYCHIATRY & NEUROLOGY

## 2021-08-27 PROCEDURE — 97530 THERAPEUTIC ACTIVITIES: CPT

## 2021-08-27 PROCEDURE — 6360000002 HC RX W HCPCS: Performed by: INTERNAL MEDICINE

## 2021-08-27 PROCEDURE — 2580000003 HC RX 258: Performed by: INTERNAL MEDICINE

## 2021-08-27 PROCEDURE — 80048 BASIC METABOLIC PNL TOTAL CA: CPT

## 2021-08-27 PROCEDURE — 97116 GAIT TRAINING THERAPY: CPT

## 2021-08-27 PROCEDURE — 6370000000 HC RX 637 (ALT 250 FOR IP): Performed by: INTERNAL MEDICINE

## 2021-08-27 PROCEDURE — 94640 AIRWAY INHALATION TREATMENT: CPT

## 2021-08-27 PROCEDURE — 36415 COLL VENOUS BLD VENIPUNCTURE: CPT

## 2021-08-27 PROCEDURE — 92526 ORAL FUNCTION THERAPY: CPT

## 2021-08-27 PROCEDURE — 6370000000 HC RX 637 (ALT 250 FOR IP): Performed by: FAMILY MEDICINE

## 2021-08-27 PROCEDURE — 97129 THER IVNTJ 1ST 15 MIN: CPT

## 2021-08-27 PROCEDURE — 97535 SELF CARE MNGMENT TRAINING: CPT

## 2021-08-27 PROCEDURE — 85027 COMPLETE CBC AUTOMATED: CPT

## 2021-08-27 PROCEDURE — 99233 SBSQ HOSP IP/OBS HIGH 50: CPT | Performed by: PSYCHIATRY & NEUROLOGY

## 2021-08-27 RX ORDER — FAMOTIDINE 20 MG/1
20 TABLET, FILM COATED ORAL 2 TIMES DAILY
Status: DISCONTINUED | OUTPATIENT
Start: 2021-08-27 | End: 2021-08-27 | Stop reason: HOSPADM

## 2021-08-27 RX ADMIN — SODIUM CHLORIDE, PRESERVATIVE FREE 10 ML: 5 INJECTION INTRAVENOUS at 11:38

## 2021-08-27 RX ADMIN — ATORVASTATIN CALCIUM 40 MG: 40 TABLET, FILM COATED ORAL at 11:37

## 2021-08-27 RX ADMIN — LAMOTRIGINE 100 MG: 100 TABLET ORAL at 11:37

## 2021-08-27 RX ADMIN — ACETAMINOPHEN 650 MG: 325 TABLET ORAL at 11:42

## 2021-08-27 RX ADMIN — ESCITALOPRAM OXALATE 20 MG: 10 TABLET, FILM COATED ORAL at 11:38

## 2021-08-27 RX ADMIN — PREDNISONE 20 MG: 20 TABLET ORAL at 11:37

## 2021-08-27 RX ADMIN — ENOXAPARIN SODIUM 40 MG: 40 INJECTION SUBCUTANEOUS at 11:42

## 2021-08-27 RX ADMIN — BUDESONIDE 1000 MCG: 0.5 SUSPENSION RESPIRATORY (INHALATION) at 07:01

## 2021-08-27 RX ADMIN — MELOXICAM 15 MG: 7.5 TABLET ORAL at 11:38

## 2021-08-27 RX ADMIN — FAMOTIDINE 20 MG: 20 TABLET ORAL at 11:42

## 2021-08-27 RX ADMIN — ARIPIPRAZOLE 2 MG: 2 TABLET ORAL at 11:38

## 2021-08-27 RX ADMIN — ALPRAZOLAM 1 MG: 1 TABLET ORAL at 15:03

## 2021-08-27 RX ADMIN — GABAPENTIN 600 MG: 600 TABLET, FILM COATED ORAL at 11:37

## 2021-08-27 RX ADMIN — PREGABALIN 75 MG: 75 CAPSULE ORAL at 11:37

## 2021-08-27 RX ADMIN — ASPIRIN 81 MG: 81 TABLET, CHEWABLE ORAL at 11:37

## 2021-08-27 RX ADMIN — CYCLOBENZAPRINE 10 MG: 10 TABLET, FILM COATED ORAL at 11:37

## 2021-08-27 ASSESSMENT — PAIN SCALES - GENERAL
PAINLEVEL_OUTOF10: 9
PAINLEVEL_OUTOF10: 9

## 2021-08-27 NOTE — PROGRESS NOTES
Physical Therapy  Dre Stout  [id number]     08/27/21 1318   Subjective   Subjective Patient up in chair and agrees to work with therapy. Transfers   Sit to Stand Contact guard assistance   Stand to sit Contact guard assistance   Ambulation   Ambulation? Yes   Ambulation 1   Surface level tile   Device No Device   Assistance Contact guard assistance   Distance 5' x2   Stairs/Curb   Stairs? Yes   Stairs   # Steps  5   Stairs Height 6\"   Rails Bilateral   Assistance Supervision   Comment patient able to safely demonstrate stair training up and down 5 steps 2x   Other Activities   Comment Patient states feeling better this afternoon and hoping to go home. Patient ambulated short distance in room to w/c and rode to the stairs for stair training. Patient able to safely demonstrate stair training. Patient returned to room via w/c and ambulated back to recliner. Patient positioned for comfort with all needs in reach. Short term goals   Time Frame for Short term goals 14 DAYS   Short term goal 1 BED MOB MOD IND   Short term goal 2 TRANSFERS SBA   Short term goal 3 ' RW SBA   Activity Tolerance   Activity Tolerance Patient Tolerated treatment well   Safety Devices   Type of devices Call light within reach; Left in chair   Electronically signed by Claudia Lord PTA on 8/27/2021 at 1:23 PM

## 2021-08-27 NOTE — PROGRESS NOTES
Physical Therapy  Name: Dre Stout  MRN:  [id number]  Date of service:  8/27/2021 08/27/21 3444   Restrictions/Precautions   Restrictions/Precautions Fall Risk   Subjective   Subjective Pt agreed to work with therapy, she reported she has more feeling in her legs today. Pain Screening   Patient Currently in Pain No   Bed Mobility   Supine to Sit Contact guard assistance   Transfers   Sit to Stand Contact guard assistance   Stand to sit Contact guard assistance   Bed to Chair Contact guard assistance   Ambulation   Ambulation? Yes   Ambulation 1   Surface level tile   Device Standard Walker   Assistance Contact guard assistance   Gait Deviations Decreased step length   Distance 5 ft   Short term goals   Time Frame for Short term goals 14 DAYS   Short term goal 1 BED MOB MOD IND   Short term goal 2 TRANSFERS SBA   Short term goal 3 ' RW SBA   Conditions Requiring Skilled Therapeutic Intervention   Body structures, Functions, Activity limitations Decreased functional mobility ; Decreased ADL status; Decreased strength;Decreased ROM; Decreased posture;Decreased balance;Decreased sensation   Assessment Pt had improved mobility today and reported more feeling in LE's. Pt able to complete bed mobility and transfer with CGA. Pt up in chair with call light in reach.    Activity Tolerance   Activity Tolerance Patient Tolerated treatment well   Safety Devices   Type of devices Left in chair;Call light within reach         Electronically signed by Nimo Pace PTA on 8/27/2021 at 9:26 AM

## 2021-08-27 NOTE — PROGRESS NOTES
Patient was taking a shower and when we made her bed up we found a stash of ambien tablets,   9 1/2 pills that she was saving. she states that 10mg is not enough she takes 20-30 mg of ambien at night,  We found all her home meds and there was not an ambien bottle in her bag,  All meds are now locked in her room and sitter is there just for tonight. Patient claims she does not want to harm her self just takes extra [place].

## 2021-08-27 NOTE — DISCHARGE SUMMARY
Hoa Wild  :  1995  MRN:  [id number]    Admit date:  2021  Discharge date:      Admitting Physician:  No admitting provider for patient encounter. Advance Directive: Full Code    Consults: neurology, neurosurgery, and psychiatry (not completed)    Primary Care Physician:  No primary care provider on file. Discharge Diagnoses:  Principal Problem:    Spinal stenosis of lumbar region with neurogenic claudication  Active Problems:    Seizure disorder ([address]    Multiple sclerosis ([address]  Resolved Problems:    * No resolved hospital problems. *      Significant Diagnostic Studies:   MRI CERVICAL SPINE W WO CONTRAST    Result Date: 2021  MRI CERVICAL SPINE W WO CONTRAST 2021 3:40 PM HISTORY: Seizure, movement disorder COMPARISON: None TECHNIQUE: Multiplanar, multisequence MRI of the cervical spine was obtained with and without contrast. 20 mL MultiHance IV contrast. FINDINGS: The cervical spine is imaged from the posterior fossa through T2. Imaged portions of the cerebellum and brainstem are unremarkable. Normal alignment across the craniocervical junction. Alignment: Flattened lordosis. There is no evidence of listhesis or subluxation. Marrow signal: No pathologic marrow infiltrate is demonstrated. The vertebral body heights and posterior elements are maintained. Cord: The spinal cord is normal in signal and morphology. No short segment lesions or pathologic enhancement. Soft tissues: The surrounding soft tissues are unremarkable. Levels: C2-C3: No disc herniation or significant disc bulge. No significant spinal stenosis. No significant neuroforaminal narrowing. C3-C4: No disc herniation or significant disc bulge. No significant spinal stenosis. No significant neuroforaminal narrowing. C4-C5: No disc herniation or significant disc bulge. No significant spinal stenosis. No significant neuroforaminal narrowing. C5-C6: No disc herniation or significant disc bulge.  No significant spinal stenosis. No significant neuroforaminal narrowing. C6-C7: No disc herniation or significant disc bulge. No significant spinal stenosis. No significant neuroforaminal narrowing. C7-T1: No disc herniation or significant disc bulge. No significant spinal stenosis. Left-sided facet arthropathy with mild neuroforaminal narrowing. 1. Cervical cord is unremarkable. No short segment lesions and/or enhancing lesions. 2. Flattened lordosis. Signed by Dr Yeny Rosales    [facility]    Result Date: 8/25/2021  MRI THORACIC SPINE W WO CONTRAST 8/24/2021 3:41 PM HISTORY: Seizure, movement disorder COMPARISON : NONE Technique: Multiplanar, multisequence MRI of the thoracic spine was obtained with and without the use of contrast. 20 mL MultiHance IV contrast. Findings: The thoracic spine maintains appropriate anatomic alignment and demonstrates no significant morphologic abnormalities. The thoracic vertebral bodies and intervertebral disc spaces demonstrate appropriate signal intensity and morphology. The thoracic spinal cord is normal in signal intensity and morphology. There is no evidence of significant disc bulge or protrusion. There is no demonstration of spinal stenosis, neural foraminal narrowing, or pars defects. The surrounding paraspinous soft tissues are grossly unremarkable. No pathologic enhancement. Impression: 1. Unremarkable MRI of the Thoracic spine. 2. Thoracic cord is unremarkable. No short segment lesions or pathologic enhancement. Signed by Dr Yeny Rosales    [facility]    Result Date: 8/25/2021  MRI LUMBAR SPINE W WO CONTRAST 8/24/2021 3:41 PM HISTORY: Seizure, movement disorder, low back pain, weakness COMPARISON: None TECHNIQUE: Multiplanar, multisequence MRI of the lumbar spine was performed with and without the use of contrast. 20 mL MultiHance IV contrast. FINDINGS: The lumbar spine is visualized from T11 through S2.  There are presumed to be 5 lumbar-type vertebrae, with the most inferior being labeled as L5. Alignment: Lumbar lordosis is maintained. There is a grade 1 lytic spondylolisthesis at L5-S1. Marrow signal: No pathologic marrow infiltrate is demonstrated. Vertebral body heights are well-maintained. Cord: The conus medullaris terminates at the level of L1. The visualized spinal cord is normal in signal and morphology. Soft tissues: Left kidney appears completely atrophic. Levels: L1-L2: No disc herniation or significant disc bulge. No significant spinal stenosis. No significant neuroforaminal narrowing. L2-L3: No disc herniation or significant disc bulge. No significant spinal stenosis. No significant neuroforaminal narrowing. L3-L4: No disc herniation or significant disc bulge. No significant spinal stenosis. No significant neuroforaminal narrowing. L4-L5: No disc herniation or significant disc bulge. No significant spinal stenosis. No significant neuroforaminal narrowing. L5-S1: Disc desiccation with loss of disc height. No significant spinal stenosis. Severe right-sided and moderate left-sided neuroforaminal narrowing as result of the lytic spondylolisthesis. 1. Grade 1 lytic spondylolisthesis at L5-S1 resulting in high-grade bilateral neuroforaminal narrowing. 2. No significant spinal stenosis. 3. Left kidney is completely atrophic. Signed by Dr Larry Rose    MRI [facility]     Result Date: 8/25/2021  MRI BRAIN W WO CONTRAST 8/24/2021 3:25 PM HISTORY: Seizure, movement disorder Comparison: None. Technique: Multiplanar imaging of the brain was performed in a routine fashion before and after the intravenous injection of gadolinium contrast. 20 mL MultiHance IV contrast. Findings: There is no diffusion signal abnormality to suggest acute infarct. There is no intra-axial or extra-axial hemorrhage. No visualized mass lesion, white matter FLAIR abnormalities or pathologic enhancement.  Normal size and configuration of the ventricular system for patient age. The posterior fossa structures are unremarkable. The pituitary gland and sella are unremarkable. The major intracranial flow voids are preserved. The orbits are unremarkable. The paranasal sinuses and mastoids are clear. Marrow signal in the calvarium and skull base appears normal.    Impression: 1. Unremarkable MRI examination of the brain. 2. No acute process identified. 3. There are no white matter FLAIR abnormalities or enhancing abnormalities to suggest underlying acute or chronic demyelination. Signed by Dr Anastacia Hodges      Pertinent Labs:   CBC:   Recent Labs     08/26/21  0834 08/27/21  0352   WBC 6.8 11.7*   HGB 12.6 12.4    297     BMP:    Recent Labs     08/26/21  0834 08/27/21  0352    137   K 4.6 4.5    104   CO2 26 21*   BUN 14 11   CREATININE 1.1* 1.0*   GLUCOSE 92 115*     INR: No results for input(s): INR in the last 72 hours. ABGs:No results for input(s): PH, PO2, PCO2, HCO3, BE, O2SAT in the last 72 hours. Lactic Acid:No results for input(s): LACTA in the last 72 hours. Procedures: None performed. Hospital Course:   8-24: Received in transfer from OSH with weakness and pain, suspected seizure or MS flare. Diagnosed with MS, stroke, and spondylolisthesis, have been discussing back surgery for about a year. Multiple falls at home due to weakness, unsteady gait, tremors. Admitted to med/surg with neurology consult. 8-25: Workup has been unrevealing, low likelihood of MS, stroke, or seizure disorder. The patient does have spinal stenosis so neurosurgery was consulted. Disease would not explain her constellation of symptoms, feels outpatient follow up with her neurosurgeon is warranted. She did poorly with PT and there are concerns about discharging her to a second story apartment, so will convert to inpatient to keep for reevaluation tomorrow. May need placement for PT/OT. 8-26: Pending placement with precert.  Working with PT and doing better but still unable to get in or out of second story apartment. 8-27: Patient was found with zolpidem tablets in her room, states that she uses 20-30 mg per night in order to sleep. Counseled at length regarding the danger and illegality of this. Flexion/extension films yesterday showed no disease to warrant surgery per neurosurgery. She performed better with therapy today and was cleared for discharge to home after refusing SNF placement. Physical Exam:  Vitals: /76   Pulse 114   Temp 97.1 °F (36.2 °C)   Resp 18   Ht 5' 6\" (1.676 m)   Wt 226 lb (102.5 kg)   SpO2 97%   BMI 36.48 kg/m²   24HR INTAKE/OUTPUT:      Intake/Output Summary (Last 24 hours) at 8/27/2021 1424  Last data filed at 8/27/2021 0800  Gross per 24 hour   Intake 480 ml   Output 2650 ml   Net -2170 ml     General appearance: alert and cooperative with exam  HEENT: atraumatic, eyes with clear conjunctiva and normal lids, pupils and irises normal, external ears and nose are normal, lips normal. Neck without masses, lympadenopathy, bruit, thyroid normal  Lungs: no increased work of breathing, diminished breath sounds bilaterally  Heart: regular rate and rhythm, S1, S2 normal, no murmur, click, rub or gallop  Abdomen: soft, non-tender; bowel sounds normal; no masses,  no organomegaly and obese  Extremities: extremities normal without clubbing, atraumatic, no cyanosis or edema  Neurologic: no focal neurologic deficits, normal sensation, alert and oriented, affect and mood appropriate  Skin: no jaundice, rashes, or nodules    Discharge Medications:       Barbara Rogers   Home Medication Instructions LWY:[id number]    Printed on:08/27/21 1424   Medication Information                      ALPRAZolam (XANAX) 1 MG tablet  Take 1 mg by mouth 4 times daily as needed.              ARIPiprazole (ABILIFY) 2 MG tablet  Take 2 mg by mouth daily             aspirin 81 MG chewable tablet  Take 81 mg by mouth daily             atorvastatin (LIPITOR) 40 MG tablet  Take 40 mg by mouth daily             beclomethasone (QVAR REDIHALER) 80 MCG/ACT AERB inhaler  Inhale 2 puffs into the lungs as needed             cyclobenzaprine (FLEXERIL) 5 MG tablet  Take 10 mg by mouth 2 times daily as needed             escitalopram (LEXAPRO) 20 MG tablet  Take 20 mg by mouth daily             gabapentin (NEURONTIN) 600 MG tablet  Take 600 mg by mouth 3 times daily. lamoTRIgine (LAMICTAL) 100 MG tablet  Take 100 mg by mouth daily             meloxicam (MOBIC) 15 MG tablet  Take 15 mg by mouth daily             ondansetron (ZOFRAN-ODT) 4 MG disintegrating tablet  Take 4 mg by mouth every 6 hours as needed             pregabalin (LYRICA) 75 MG capsule  Take 75 mg by mouth 2 times daily. zolpidem (AMBIEN) 10 MG tablet  Take 10 mg by mouth nightly. Discharge Instructions: Follow up with No primary care provider on file. in 3 days. Take medications as directed. Resume activity as tolerated. Diet: ADULT DIET; Regular; No swiss cheese     Disposition: Patient is medically stable and will be discharged Home. Time spent on discharge 35 minutes.     Signed:  Yinka Mead DO

## 2021-08-27 NOTE — CARE COORDINATION
Informed [facility] that pt will dc home.    [facility]   B  [phone] F  Electronically signed by Marv Verdin on 8/27/2021 at 9:38 AM

## 2021-08-27 NOTE — CARE COORDINATION
Met with pt at bedside re: [place] plans / needs. Pt stated she will have support at home from significant other, but will need a wheelchair and a bedside commode. Pt stated she uses [facility] in [place], [place].  [phone]      Orders were faxed. They will deliver to pts home. Informed pt to call [facility] at [place].

## 2021-08-27 NOTE — CONSULTS
[facility]  Psychiatry Consult    Reason for Consult: Concern   Depression, anxiety, psychogenic symptoms, found hiding Ambien in her room, rule out suicidal ideations    The primary source(s) of information include(s):  patient    The patient is a 31 y.o. female with previous psychiatric history of depression, anxiety disorder, borderline personality disorder, who has been admitted to medical services for differential diagnosis between seizures and flareups of multiple sclerosis. Patient has been consulted by neurology. Imaging studies were recommended, medications were adjusted. Patient has been seen in her room. She was sitting on the chair and was wearing hospital attire. Patient reported that she has been diagnosed with depression and anxiety disorder and she follows with her psychiatrist on outpatient basis. Patient reported that recently she started to feel that her antidepressant medications are not effective anymore and 2 weeks ago her psychiatrist adjusted patient's psychotropic medication and started her on Abilify 5 mg for depression. Patient stated that she did not feel the effect of medication yet. Today during the interview, she endorsed mild feeling of depression and decreased quality of sleep, denies any anxiety or psychotic symptoms. Patient denies any feeling of hopelessness and helplessness. She denies any other affective symptomatology. Denies suicidal or homicidal ideations, denies any plans. Also, patient denies any auditory visual hallucinations. Patient did not endorse any delusions or paranoid ideations. She reported treatment compliance with prescribed psychotropic medications. I discussed with patient possibility to admit her to psychiatry for possible psychotropic medications management. However, patient declined this option.   Recommended to follow with patient's psychiatrist on outpatient basis for her psychotropic medications making her own medical decisions. 3. Patient does not present imminent danger to self or others. 4.  Please, continue patient's psychotropic medications as prescribed and recommended. 5.  Patient can be discharged home when she is medically stable with follow-up appointment with her psychiatrist.  6.  Psychiatry will sign off today.     Salbador Landin MD

## 2021-08-27 NOTE — PROGRESS NOTES
[facility]  Progress Note    LAST 24 HOURS: Ms. WELLSTAR [facility] is sitting up in bed talking on the phone this morning. She states she is good today. She did not have any complaints when asked. Per the nursing records, she has been hiding Ambien pills in her room. Moreno Mejia ran and she is prescribed 10mg quantity of #30 per month. CHIEF COMPLAINT:  Seizure    HISTORY OF PRESENT ILLNESS:      This is a 31 y.o. female who was admitted with new onset seizure. The patient has a complicated past medical history which includes a possible recent diagnosis of MS, possible recent stroke, new onset seizure, and chronic back pain. As far as her back pain goes she states she has had back pain for many months. She has been evaluated by Dr. Basilia Prado at [facility] and states that he was recommending she move forward with a fusion procedure. She states that her entire legs are numbness from the waist down. She complains of leg pain but its \"in the front and back\" and doesn't follow any real dermatomal pattern. The patient does not take anticoagulation medication.        Past Medical History:   Diagnosis Date    Asthma     Cerebral artery occlusion with cerebral infarction ([address]     2021    Movement disorder     Psychiatric problem     Seizures ([address]     Tachycardia 2021       Past Surgical History:   Procedure Laterality Date     SECTION  2016    HYSTERECTOMY      2018    INSERTABLE CARDIAC MONITOR  2021        Medications    Current Facility-Administered Medications:     famotidine (PEPCID) tablet 20 mg, 20 mg, Oral, BID, Dalia Mcclendon MD    [COMPLETED] predniSONE (DELTASONE) tablet 40 mg, 40 mg, Oral, Daily, 40 mg at 21 0928 **FOLLOWED BY** predniSONE (DELTASONE) tablet 20 mg, 20 mg, Oral, Daily, [facility],     ALPRAZojamison Prado) tablet 1 mg, 1 mg, Oral, 4x Daily PRN, Dalia Mcclendon MD, 1 mg at 21 5019    ARIPiprazole (ABILIFY) tablet 2 mg, 2 mg, Oral, Daily, Lia De Souza MD, 2 mg at 08/26/21 0948    aspirin chewable tablet 81 mg, 81 mg, Oral, Daily, Lia De Souza MD, 81 mg at 08/26/21 8886    atorvastatin (LIPITOR) tablet 40 mg, 40 mg, Oral, Daily, Lia De Souza MD, 40 mg at 08/26/21 0928    budesonide (PULMICORT) nebulizer suspension 1,000 mcg, 1 mg, Nebulization, BID, Lia De Souza MD, 1,000 mcg at 08/26/21 1948    cyclobenzaprine (FLEXERIL) tablet 10 mg, 10 mg, Oral, TID, Lia De Souza MD, 10 mg at 08/26/21 2049    escitalopram (LEXAPRO) tablet 20 mg, 20 mg, Oral, Daily, Lia De Souza MD, 20 mg at 08/26/21 1740    gabapentin (NEURONTIN) tablet 600 mg, 600 mg, Oral, TID, Lia De Souza MD, 600 mg at 08/26/21 2050    lamoTRIgine (LAMICTAL) tablet 100 mg, 100 mg, Oral, Daily, Lia De Souza MD, 100 mg at 08/26/21 4139    meloxicam (MOBIC) tablet 15 mg, 15 mg, Oral, Daily, Lia De Souza MD, 15 mg at 08/26/21 0928    pregabalin (LYRICA) capsule 75 mg, 75 mg, Oral, BID, Lia De Souza MD, 75 mg at 08/26/21 2049    zolpidem (AMBIEN) tablet 10 mg, 10 mg, Oral, Nightly, Lia De Souza MD, 10 mg at 08/26/21 2050    0.9 % sodium chloride bolus, 500 mL, IntraVENous, Once, Lia De Souza MD    sodium chloride flush 0.9 % injection 5-40 mL, 5-40 mL, IntraVENous, 2 times per day, Lia De Souza MD, 10 mL at 08/26/21 2050    sodium chloride flush 0.9 % injection 5-40 mL, 5-40 mL, IntraVENous, PRN, Lia De Souza MD    0.9 % sodium chloride infusion, 25 mL, IntraVENous, PRN, Lia De Souza MD    enoxaparin (LOVENOX) injection 40 mg, 40 mg, Subcutaneous, Daily, Lia De Souza MD, 40 mg at 08/26/21 0937    ondansetron (ZOFRAN-ODT) disintegrating tablet 4 mg, 4 mg, Oral, Q8H PRN **OR** ondansetron (ZOFRAN) injection 4 mg, 4 mg, IntraVENous, Q6H PRN, Lia De Souza MD, 4 mg at 08/24/21 1221    polyethylene glycol (GLYCOLAX) packet 17 g, 17 g, Oral, Daily PRN, Eileen Gilbert MD    acetaminophen (TYLENOL) tablet 650 mg, 650 mg, Oral, Q6H PRN, 650 mg at 08/26/21 1316 **OR** acetaminophen (TYLENOL) suppository 650 mg, 650 mg, Rectal, Q6H PRN, Eileen Gilbert MD    levETIRAcetam (KEPPRA) tablet 500 mg, 500 mg, Oral, BID, Dejan Wakefield, , 500 mg at 08/26/21 2049  Patient has no active allergies. Social History  Social History     Tobacco Use   Smoking Status Never Smoker   Smokeless Tobacco Never Used     Social History     Substance and Sexual Activity   Alcohol Use Never         Family History   Problem Relation Age of Onset    No Known Problems Mother     Cancer Father     High Cholesterol Father     No Known Problems Sister     No Known Problems Brother     No Known Problems Maternal Grandmother     No Known Problems Maternal Grandfather     Heart Disease Paternal Grandmother     Diabetes Paternal Grandmother     No Known Problems Paternal Grandfather     Other Child     Other Child     No Known Problems Sister          REVIEW OF SYSTEMS:  Constitutional: No fevers No chills  Neck:No stiffness  Respiratory: No shortness of breath  Cardiovascular: No chest pain No palpitations  Gastrointestinal: No abdominal pain    Genitourinary: No Dysuria  Neurological: No headache, no confusion    PHYSICAL EXAM:  Vitals:    08/27/21 0624   BP: 108/77   Pulse: 107   Resp: 17   Temp: 97.1 °F (36.2 °C)   SpO2: 94%       Constitutional: The patient appears well-developed and well-nourished.    Eyes - conjunctiva normal.  Conjugate Gaze  Ear, nose, throat - No scars, masses, or lesions over external nose or ears, no atrophy of tongue  Neck-symmetric, no masses noted, no jugular vein distension  Respiration- chest wall appears symmetric, good expansion, normal effort without use of accessory muscles  Musculoskeletal - no significant wasting of muscles noted, no bony deformities  Extremities-no clubbing, cyanosis or edema  Skin - warm, dry, and intact. No rash, erythema, or pallor. Psychiatric - mood, affect, and behavior appear normal.     Neurologic Examination  Awake, Alert and oriented x 4  Normal speech pattern, following commands  Motor 5/5 all extremities, some mild pain limited to weakness in lower extremities 4/5 bilaterally, poor participation  No deficits to light touch or pinprick sensation  Reflexes are 2+ and symmetric, right patellar reflex is slighly diminished. DATA:  Nursing/pcp notes, imaging,labs and vitals reviewed. PT,OT and/or speech notes reviewed    Lab Results   Component Value Date    WBC 11.7 (H) 08/27/2021    HGB 12.4 08/27/2021    HCT 36.8 (L) 08/27/2021    MCV 95.1 08/27/2021     08/27/2021     Lab Results   Component Value Date     08/27/2021    K 4.5 08/27/2021     08/27/2021    CO2 21 (L) 08/27/2021    BUN 11 08/27/2021    CREATININE 1.0 (H) 08/27/2021    GLUCOSE 115 (H) 08/27/2021    CALCIUM 9.5 08/27/2021    PROT 6.1 (L) 08/24/2021    LABALBU 3.7 08/24/2021    BILITOT 0.5 08/24/2021    ALKPHOS 81 08/24/2021    AST 24 08/24/2021    ALT 19 08/24/2021    LABGLOM >60 08/27/2021    GFRAA >59 08/27/2021   No results found for: INR, PROTIME    MRI BRAIN W WO CONTRAST 8/24/2021 3:25 PM   HISTORY: Seizure, movement disorder   Comparison: None. Technique: Multiplanar imaging of the brain was performed in a routine   fashion before and after the intravenous injection of gadolinium   contrast. 20 mL MultiHance IV contrast.   Findings: There is no diffusion signal abnormality to suggest acute infarct. There is no intra-axial or extra-axial hemorrhage. No visualized mass   lesion, white matter FLAIR abnormalities or pathologic enhancement. Normal size and configuration of the ventricular system for patient   age. The posterior fossa structures are unremarkable. The pituitary   gland and sella are unremarkable. The major intracranial flow voids   are preserved.     The orbits are unremarkable. The paranasal sinuses and mastoids are   clear. Marrow signal in the calvarium and skull base appears normal.       Impression   Impression:    1. Unremarkable MRI examination of the brain. 2. No acute process identified. 3. There are no white matter FLAIR abnormalities or enhancing   abnormalities to suggest underlying acute or chronic demyelination. Signed by Dr Kevin Goodman     I have personally reviewed the images and my interpretation is:  No evidence of acute ischemia. No sign of old infarct as well. No signal change on FLAIR or evidence of white matter disease. No HCP extra-axial fluid collections . MRI CERVICAL SPINE W WO CONTRAST 8/24/2021 3:40 PM   HISTORY: Seizure, movement disorder   COMPARISON: None    TECHNIQUE: Multiplanar, multisequence MRI of the cervical spine was   obtained with and without contrast. 20 mL MultiHance IV contrast.   FINDINGS:    The cervical spine is imaged from the posterior fossa through T2. Imaged portions of the cerebellum and brainstem are unremarkable. Normal alignment across the craniocervical junction. Alignment: Flattened lordosis. There is no evidence of listhesis or   subluxation. Marrow signal: No pathologic marrow infiltrate is demonstrated. The   vertebral body heights and posterior elements are maintained. Cord: The spinal cord is normal in signal and morphology. No short   segment lesions or pathologic enhancement. Soft tissues: The surrounding soft tissues are unremarkable. Levels:   C2-C3: No disc herniation or significant disc bulge. No significant   spinal stenosis. No significant neuroforaminal narrowing. C3-C4: No disc herniation or significant disc bulge. No significant   spinal stenosis. No significant neuroforaminal narrowing. C4-C5: No disc herniation or significant disc bulge. No significant   spinal stenosis. No significant neuroforaminal narrowing.    C5-C6: No disc herniation or significant disc bulge. No significant   spinal stenosis. No significant neuroforaminal narrowing. C6-C7: No disc herniation or significant disc bulge. No significant   spinal stenosis. No significant neuroforaminal narrowing. C7-T1: No disc herniation or significant disc bulge. No significant   spinal stenosis. Left-sided facet arthropathy with mild neuroforaminal   narrowing. Impression   1. Cervical cord is unremarkable. No short segment lesions and/or   enhancing lesions. 2. Flattened lordosis. Signed by Dr Nikole Hays     I have personally reviewed the images and my interpretation is:  Some straightening of the cervical spine  No evidence of stenosis       MRI THORACIC SPINE W WO CONTRAST 8/24/2021 3:41 PM   HISTORY: Seizure, movement disorder   COMPARISON : NONE    Technique: Multiplanar, multisequence MRI of the thoracic spine was   obtained with and without the use of contrast. 20 mL MultiHance IV   contrast.   Findings: The thoracic spine maintains appropriate anatomic alignment and   demonstrates no significant morphologic abnormalities. The thoracic vertebral bodies and intervertebral disc spaces   demonstrate appropriate signal intensity and morphology. The thoracic   spinal cord is normal in signal intensity and morphology. There is no evidence of significant disc bulge or protrusion. There is   no demonstration of spinal stenosis, neural foraminal narrowing, or   pars defects. The surrounding paraspinous soft tissues are grossly unremarkable. No   pathologic enhancement. Impression   Impression:    1. Unremarkable MRI of the Thoracic spine. 2. Thoracic cord is unremarkable. No short segment lesions or   pathologic enhancement. Signed by Dr Nikole Hays     I have personally reviewed the images and my interpretation is:  No evidence of any stenosis or cord compression in the thoracic spine.   No myelopathy.,    MRI LUMBAR SPINE W WO CONTRAST 8/24/2021 3:41 PM   HISTORY: Seizure, movement disorder, low back pain, weakness   COMPARISON: None    TECHNIQUE: Multiplanar, multisequence MRI of the lumbar spine was   performed with and without the use of contrast. 20 mL MultiHance IV   contrast.   FINDINGS:    The lumbar spine is visualized from T11 through S2. There are presumed   to be 5 lumbar-type vertebrae, with the most inferior being labeled as   L5. Alignment: Lumbar lordosis is maintained. There is a grade 1 lytic   spondylolisthesis at L5-S1. Marrow signal: No pathologic marrow infiltrate is demonstrated. Vertebral body heights are well-maintained. Cord: The conus medullaris terminates at the level of L1. The   visualized spinal cord is normal in signal and morphology. Soft tissues: Left kidney appears completely atrophic. Levels:   L1-L2: No disc herniation or significant disc bulge. No significant   spinal stenosis. No significant neuroforaminal narrowing. L2-L3: No disc herniation or significant disc bulge. No significant   spinal stenosis. No significant neuroforaminal narrowing. L3-L4: No disc herniation or significant disc bulge. No significant   spinal stenosis. No significant neuroforaminal narrowing. L4-L5: No disc herniation or significant disc bulge. No significant   spinal stenosis. No significant neuroforaminal narrowing. L5-S1: Disc desiccation with loss of disc height. No significant   spinal stenosis. Severe right-sided and moderate left-sided   neuroforaminal narrowing as result of the lytic spondylolisthesis. Impression   1. Grade 1 lytic spondylolisthesis at L5-S1 resulting in high-grade   bilateral neuroforaminal narrowing. 2. No significant spinal stenosis. 3. Left kidney is completely atrophic. Signed by Dr Skelton Sensor     I have personally reviewed the images and my interpretation is:   There is DDD at L5-S1 and small spondylolisthesis  There is moderate to severe right foraminal stenosis and mild left foraminal stenosis    Narrative   XR LUMBAR SPINE FLEXION AND EXTENSION ONLY 8/26/2021 11:24 AM   HISTORY: Spondylolisthesis   Comparison: MRI dated 8/24/2021    Findings:    Lateral neutral, flexion and extension views of the lumbar spine are   provided. There are presumed to be 5 lumbar vertebral bodies, with the   inferior-most visualized disc space being designated as L5-S1 for the   purpose of numbering. Vertebral body heights are well-maintained. Lordosis is preserved. In   the neutral position there is a 2-3 mm degenerative retrolisthesis at   L4-5. L5 level spondylolysis identified. There is a grade 1 lytic   spondylolisthesis at this L5-S1 level measuring approximately 4-5 mm. I do not see any dynamic instability. This is difficult to measure on   the neutral image as a result of the slight patient rotation. No acute   fractures seen. Impression   Impression:    1. Grade 1 lytic spondylolisthesis at L5-S1 without evidence of   dynamic instability. 2. There is a slight degenerative retrolisthesis at L4-5 as well, also   without dynamic instability. Signed by Dr Vashti Hauser  31year old female previously diagnosed with MS and stroke who presented with seizure with chronic low back pain, numbness and weakness    RECOMMENDATIONS:    Ms. Jus Herrera had no complaints this morning. Again, her MRI findings do not correlate with her described complaints yesterday. With the lack of instability on her flexion and extension films, we certainly do not believe she would dramatically benefit from surgery. No neurosurgical intervention warranted in this setting.   We would recommend a psychiatric consult due to the fact that she was found to be hiding pills, specifically controlled subtance in her room along with the fact that her complaints and imaging do not correlate, which leads us to believe some of this could by psychosomatic in nature.    -She can continue her follow up with Dr. Atkins Shoulders or us in the future if she would like         UDAY Vargas    Neurosurgery Attending  I have reviewed this case thoroughly with the provider above. I have seen and evaluated this patient myself. I have reviewed all the imaging studies, labs, notes etc. within the chart. I agree. Flexion and extension xrays without instability. Do NOT recommend any surgical intervention. She is cleared for discharge from a neurosurgical standpoint.   Follow up with Dr. Cathy Khan, DO

## 2021-08-27 NOTE — PROGRESS NOTES
Speech Language Pathology  Facility/Department: [facility] SURG SERVICES  Speech/Language/Cognitive/Swallow Therapy     NAME: Shayan Medrano  : 1995   MRN: [id number]  ADMISSION DATE: 2021  ADMITTING DIAGNOSIS: has Seizure disorder ([facility] [address] Multiple sclerosis ([facility] and Spinal stenosis of lumbar region with neurogenic claudication on their problem list.    Date of Treat: 2021   Evaluating Therapist: TIAN Yu    Assessment:  Monitored patient's speech/language/cognitive functioning. SLP continued to rank functional intelligibility of speech for unfamiliar listeners at 100% in utterances with background noise present. Patient still exhibited slow processing with delayed, but appropriate, auditory comprehension and verbalizations noted.     Also monitored patient's swallowing function. Patient continued to exhibit functional oral and pharyngeal stages of swallowing and no outward S/S penetration/aspiration was observed with any regular solid consistency presentation or thin liquid presentation administered during treatment session this date.     At this time, would recommend continuation regular solid consistency with thin liquids. Self-feed.     Subjective:  Chart Reviewed: Yes  Patient assessed for rehabilitation services?: Yes   Family / Caregiver Present: No     Objective: Auditory Comprehension  Comprehension:  (Patient continued to demonstrate ability to answer simple yes/no questions regarding immediate environment and current state of being at independent level and with adequate processing speed.  Patient still demonstrated ability to follow simple 1 step commands independently and with adequate processing speed. While mildly delayed, patient continued to demonstrate ability to answer yes/no questions of increased complexity and to follow complex 1 and simple 2 step commands at independent level.)     Expression  Primary Mode of Expression:  (Confrontation naming of items in room was still considered to be [facility]. Structured responsive speech continued to be considered mildly delayed. Responses in natural conversation were still considered to be timely and appropriate.)     Motor Speech:  (SLP continued to rank functional intelligibility of speech for unfamiliar listeners at 100% in utterances with background noise present.)     Overall Orientation Status:  (Patient still demonstrated ability to verbalize all biographical, temporal, spatial, and situational information at independent level.)     Memory:  (Patient continued to demonstrate appropriate immediate and short-term memory during treatment session.)     Problem Solving:  (Patient continued to demonstrate ability to verbalize appropriate solutions to situations that could occur during activities of daily living at independent level.)     Additional Assessment:   Monitored patient's swallowing function. With regular solid consistency presentations administered independently, patient exhibited adequate oral prep. Oral transit of regular solid consistency primarily measured 1-2 seconds in length and min oral cavity residue was noted post swallows; residue cleared from the mouth with additional dry swallows. Oral transit thin liquid presentations, administered independently via straw, primarily measured 1-2 seconds in length. Laryngeal elevation during swallow initiation was considered to be functional in timing and strength and no S/S penetration/aspiration was observed with any regular solid consistency presentation or thin liquid presentation administered during treatment session this date.     At this time, would recommend continuation regular solid consistency with thin liquids. Self-feed.      Electronically signed by TIAN Hernandez on 8/27/2021 at 1:19 PM

## 2021-08-27 NOTE — PROGRESS NOTES
[facility] Neurology Progress Note      Patient:   Bob Starkey  MR#:    [id number]   Room:    0506/506-01   YOB: 1995  Date of Progress Note: 8/27/2021  Time of Note                           7:45 AM  Consulting Physician:  Jerica Flanagan DO  Attending Physician:  Robert Harris DO      INTERVAL HISTORY:  Better overnight, no new complaints, lower extremity weakness improved this am, no overt seizures. REVIEW OF SYSTEMS:  Constitutional: No fevers No chills  Neck:No stiffness  Respiratory: No shortness of breath  Cardiovascular: No chest pain No palpitations  Gastrointestinal: No abdominal pain    Genitourinary: No Dysuria  Neurological: No headache, no confusion    PHYSICAL EXAM:    Constitutional -   /77   Pulse 107   Temp 97.1 °F (36.2 °C) (Temporal)   Resp 17   Ht 5' 6\" (1.676 m)   Wt 226 lb (102.5 kg)   SpO2 94%   BMI 36.48 kg/m²   General appearance: No acute distress   EYES -   Conjunctiva normal  Pupillary exam as below, see CN exam in the neurologic exam  ENT-    No scars, masses, or lesions over external nose or ears  Hearing normal bilaterally to finger rub  Neck-   No neck masses noted  Thyroid normal   No jugular vein distension  Cardiovascular -   No clubbing, cyanosis, or edema   Pulmonary-   Good expansion, normal effort without use of accessory muscles  Musculoskeletal -   No significant wasting of muscles noted  Gait as below, see gait exam in the neurologic exam  Muscle strength, tone, stability as below see the motor exam in the neurologic exam.   No bony deformities  Skin -   Warm, dry, and intact to inspection and palpation. No rash, erythema, or pallor  Psychiatric -   Mood, affect, and behavior appear normal    Memory as below see mental status examination in the neurologic exam    NEUROLOGICAL EXAM     Mental status    [x]? Awake, alert, oriented   [x]? Affect attention and concentration appear appropriate  [x]?  Recent and remote memory appears unremarkable  [x]? Speech normal without dysarthria or aphasia, comprehension and repetition intact. COMMENTS:   Cranial Nerves [x]? No VF deficit to confrontation  [x]? PERRLA, EOMI, no nystagmus, conjugate eye movements, no ptosis  [x]? Face symmetric  [x]? Facial sensation intact  [x]? Tongue midline no atrophy or fasciculations present  [x]? Palate midline, hearing to finger rub normal  [x]? Shoulder shrug and SCM testing normal  COMMENTS:   Motor   []? 5/5 strength x 4 extremities  [x]? Normal bulk and tone  [x]? No tremor present  [x]? No rigidity or bradykinesia noted  COMMENTS: 4+/5 BLE   Sensory  []? Sensation intact to light touch, pin prick, vibration, and proprioception BLE  []? Sensation intact to light touch, pin prick, vibration, and proprioception BUE  COMMENTS: Decreased LT, PP, Vib BLE   Coordination [x]? FTN normal bilaterally   []? HTS normal bilaterally  []? LEILA normal.   COMMENTS:   Reflexes  []? Symmetric and non-pathological  [x]? Toes downgoing bilaterally  [x]? No clonus present  COMMENTS: Diminshed patellar reflex over the right side    Gait                  []? Normal steady gait    []? Ataxic    []? Spastic     []? Magnetic     []? Shuffling  [x]? Not assessed  COMMENTS:        LABS/IMAGING:    MRI CERVICAL SPINE W WO CONTRAST    Result Date: 8/25/2021  MRI CERVICAL SPINE W WO CONTRAST 8/24/2021 3:40 PM HISTORY: Seizure, movement disorder COMPARISON: None TECHNIQUE: Multiplanar, multisequence MRI of the cervical spine was obtained with and without contrast. 20 mL MultiHance IV contrast. FINDINGS: The cervical spine is imaged from the posterior fossa through T2. Imaged portions of the cerebellum and brainstem are unremarkable. Normal alignment across the craniocervical junction. Alignment: Flattened lordosis. There is no evidence of listhesis or subluxation. Marrow signal: No pathologic marrow infiltrate is demonstrated. The vertebral body heights and posterior elements are maintained. Cord: The spinal cord is normal in signal and morphology. No short segment lesions or pathologic enhancement. Soft tissues: The surrounding soft tissues are unremarkable. Levels: C2-C3: No disc herniation or significant disc bulge. No significant spinal stenosis. No significant neuroforaminal narrowing. C3-C4: No disc herniation or significant disc bulge. No significant spinal stenosis. No significant neuroforaminal narrowing. C4-C5: No disc herniation or significant disc bulge. No significant spinal stenosis. No significant neuroforaminal narrowing. C5-C6: No disc herniation or significant disc bulge. No significant spinal stenosis. No significant neuroforaminal narrowing. C6-C7: No disc herniation or significant disc bulge. No significant spinal stenosis. No significant neuroforaminal narrowing. C7-T1: No disc herniation or significant disc bulge. No significant spinal stenosis. Left-sided facet arthropathy with mild neuroforaminal narrowing. 1. Cervical cord is unremarkable. No short segment lesions and/or enhancing lesions. 2. Flattened lordosis. Signed by Dr Lizbeth Gutierres    MRI [address]    Result Date: 8/25/2021  MRI THORACIC SPINE W WO CONTRAST 8/24/2021 3:41 PM HISTORY: Seizure, movement disorder COMPARISON : NONE Technique: Multiplanar, multisequence MRI of the thoracic spine was obtained with and without the use of contrast. 20 mL MultiHance IV contrast. Findings: The thoracic spine maintains appropriate anatomic alignment and demonstrates no significant morphologic abnormalities. The thoracic vertebral bodies and intervertebral disc spaces demonstrate appropriate signal intensity and morphology. The thoracic spinal cord is normal in signal intensity and morphology. There is no evidence of significant disc bulge or protrusion. There is no demonstration of spinal stenosis, neural foraminal narrowing, or pars defects. The surrounding paraspinous soft tissues are grossly unremarkable. No pathologic enhancement. Impression: 1. Unremarkable MRI of the Thoracic spine. 2. Thoracic cord is unremarkable. No short segment lesions or pathologic enhancement. Signed by Dr Magdalena Lawrence    MRI Sissy Lux    Result Date: 8/25/2021  MRI LUMBAR SPINE W WO CONTRAST 8/24/2021 3:41 PM HISTORY: Seizure, movement disorder, low back pain, weakness COMPARISON: None TECHNIQUE: Multiplanar, multisequence MRI of the lumbar spine was performed with and without the use of contrast. 20 mL MultiHance IV contrast. FINDINGS: The lumbar spine is visualized from T11 through S2. There are presumed to be 5 lumbar-type vertebrae, with the most inferior being labeled as L5. Alignment: Lumbar lordosis is maintained. There is a grade 1 lytic spondylolisthesis at L5-S1. Marrow signal: No pathologic marrow infiltrate is demonstrated. Vertebral body heights are well-maintained. Cord: The conus medullaris terminates at the level of L1. The visualized spinal cord is normal in signal and morphology. Soft tissues: Left kidney appears completely atrophic. Levels: L1-L2: No disc herniation or significant disc bulge. No significant spinal stenosis. No significant neuroforaminal narrowing. L2-L3: No disc herniation or significant disc bulge. No significant spinal stenosis. No significant neuroforaminal narrowing. L3-L4: No disc herniation or significant disc bulge. No significant spinal stenosis. No significant neuroforaminal narrowing. L4-L5: No disc herniation or significant disc bulge. No significant spinal stenosis. No significant neuroforaminal narrowing. L5-S1: Disc desiccation with loss of disc height. No significant spinal stenosis. Severe right-sided and moderate left-sided neuroforaminal narrowing as result of the lytic spondylolisthesis. 1. Grade 1 lytic spondylolisthesis at L5-S1 resulting in high-grade bilateral neuroforaminal narrowing. 2. No significant spinal stenosis. 3. Left kidney is completely atrophic. Signed by Dr Joey Cheung    MRI [facility]rit[facility] Benito    Result Date: 8/25/2021  MRI BRAIN W WO CONTRAST 8/24/2021 3:25 PM HISTORY: Seizure, movement disorder Comparison: None. Technique: Multiplanar imaging of the brain was performed in a routine fashion before and after the intravenous injection of gadolinium contrast. 20 mL MultiHance IV contrast. Findings: There is no diffusion signal abnormality to suggest acute infarct. There is no intra-axial or extra-axial hemorrhage. No visualized mass lesion, white matter FLAIR abnormalities or pathologic enhancement. Normal size and configuration of the ventricular system for patient age. The posterior fossa structures are unremarkable. The pituitary gland and sella are unremarkable. The major intracranial flow voids are preserved. The orbits are unremarkable. The paranasal sinuses and mastoids are clear. Marrow signal in the calvarium and skull base appears normal.    Impression: 1. Unremarkable MRI examination of the brain. 2. No acute process identified. 3. There are no white matter FLAIR abnormalities or enhancing abnormalities to suggest underlying acute or chronic demyelination. Signed by Dr Joey Cheung      Lab Results   Component Value Date    WBC 11.7 (H) 08/27/2021    HGB 12.4 08/27/2021    HCT 36.8 (L) 08/27/2021    MCV 95.1 08/27/2021     08/27/2021     Lab Results   Component Value Date     08/27/2021    K 4.5 08/27/2021     08/27/2021    CO2 21 (L) 08/27/2021    BUN 11 08/27/2021    CREATININE 1.0 (H) 08/27/2021    GLUCOSE 115 (H) 08/27/2021    CALCIUM 9.5 08/27/2021    PROT 6.1 (L) 08/24/2021    LABALBU 3.7 08/24/2021    BILITOT 0.5 08/24/2021    ALKPHOS 81 08/24/2021    AST 24 08/24/2021    ALT 19 08/24/2021    LABGLOM >60 08/27/2021    GFRAA >59 08/27/2021   No results found for: INR, PROTIME    RECORD REVIEW:   Previous medical records, medications were reviewed at today's visit.   Nursing/physician notes, imaging, labs and vitals reviewed. PT,OT and/or speech notes reviewed      ASSESSMENT:  31 y.o. admitted with new onset seizure, questionably recently diagnosed with MS, questionable recent stroke history, chronic back pain, anxiety. MRI brain negative for demyelinating lesions or evidence of MS or new or prior stroke. Do not feel the patient has MS, stroke felt unlikely as well. MRI C/T/L spine with ddd, moderate to severe nf narrowing at L5-S1. No overt cord lesions or severe spinal stenosis noted. Labs largely negative from a weakness standpoint. No further overt seizure activity, EEG normal.  Exam stable overnight, weakness improving.      PLAN:  1. Continue Lamictal will cover possible seizure and help with mood stabilization. Stop Keppra given normal EEG / MRI, no further events, and to ensure not having an negative effect on her mood. 2.  Neurosurgery consulted and does not feel she is a urgent surgical candidate. Recommended PT and follow up with her regular neurosurgeon as an outpatient. 3.  NCS/EMG felt low yield for GBS given exam, myopathy felt unlikely as well - CPK normal.    4.  LP felt low yield given normal MRI. 5.  PT, OT  6. DVT proph   7. Psych evaluation given possible psychogenic component with her symptoms. Also found hiding Ambien tablets, will need to ensure no SI.    8.  Ok for discharge once cleared medically and from a psych standpoint, weakness improved this this am, if does better with PT may be able to go home vs awaiting rehab placement. I will out until Monday, Dr. Nichole Nogueira will be available this weekend if needed. Please feel free to call with any questions. [phone] (cell phone).     Eric Pereyra DO  Board Certified Neurology

## 2021-08-27 NOTE — PROGRESS NOTES
Occupational Therapy     08/27/21 1342   Restrictions/Precautions   Restrictions/Precautions Fall Risk   Vision   Vision [facility]   Hearing   Hearing [facility]   General   Chart Reviewed Yes   Patient assessed for rehabilitation services? Yes   Response to previous treatment Patient unable to report, no changes reported from family or staff   Family / Caregiver Present No   Referring Practitioner Renae Covarrubias,    Diagnosis Spinal stenosis of lumbar region with neurogenic claudication   Subjective   Subjective Pt sitting up in recliner upon arrival for therapy. Pt agreeable to participate. Pain Assessment   Patient Currently in Pain Denies   ADL   Feeding Independent   Grooming Independent;Setup   UE Bathing Setup;Supervision   LE Bathing Minimal assistance;Contact guard assistance   UE Dressing Independent;Setup   LE Dressing Minimal assistance;Contact guard assistance   Toileting Contact guard assistance   Balance   Sitting Balance Independent   Standing Balance Contact guard assistance   Functional Mobility   Functional - Mobility Device No device   Activity To/from bathroom  (and to doorway )   Assist Level Contact guard assistance   Transfers   Stand Step Transfers Contact guard assistance   Sit to stand Contact guard assistance   Stand to sit Contact guard assistance   Assessment   Performance deficits / Impairments Decreased functional mobility ; Decreased ADL status; Decreased strength;Decreased endurance;Decreased balance   Assessment Pt going home today. Treatment Diagnosis Seizure Disorder, possible MS, possible recent stroke   Prognosis Good   REQUIRES OT FOLLOW UP Yes   Treatment Initiated  Tx with focus on LBD techniques while sitting in recliner, h/g at sink in standing and functional mobility to doorway with CGA/ SBA.     Discharge Recommendations Patient would benefit from continued therapy after discharge;24 hour supervision or assist;Home with Home health OT   Activity Tolerance   Activity Tolerance Patient Tolerated treatment well   Safety Devices   Safety Devices in place Yes   Type of devices Call light within reach   Plan   Times per week 3-5   Times per day Daily   Plan weeks 2   Electronically signed by GO Liriano on 8/27/2021 at 1:48 PM

## 2021-08-27 NOTE — PROGRESS NOTES
Pharmacy Intravenous to Oral Protocol    Medication changed per [address] Daugherty IV to PO protocol: Pepcid IV 20mg BID changed to oral    Patient meets the following inclusion criteria and none of the exclusion criteria:    Inclusion criteria:  - IV therapy > 24 hours (antibiotics only)  - Tolerating diet more advanced than clear liquids  - Tolerating PO medications  - No vasopressor blood pressure support (ie no signs of shock)  - Patient hasn't had a seizure for 72 hrs (antiepileptic medications only)    Exclusion criteria:  - Infections requiring IV therapy (ie meningitis, endocarditis, osteomyelitis, pancreatitis)   - Nausea and/or vomiting or severe diarrhea within past 24 hours   - Has gastrectomy, ileus, gastric outlet or bowel obstruction, or malabsorption syndromes   - Has significant painful oral ulceration   - TPN with an NPO order   - Active GI bleed   - Unable to swallow   - NPO   - Febrile in the last 24 hours (antibiotics only)   - Clinical deteriorating or unstable (antibiotics only)   - Pediatric patients and patients who are not euthyroid (not on oral levothyroxine/not stabilized on oral levothyroxine)- Levothyroxine only     Electronically signed by Errol Hernandez, [address] [place] [address] on 8/27/2021 at 4:05 AM

## (undated) DEVICE — VESSEL SEALER XI EXTENDED DISP -- VESSEL

## (undated) DEVICE — APPLICATOR FBR TIP L6IN COT TIP WOOD SHFT SWAB 2000 PER CA

## (undated) DEVICE — LEGGINGS, PAIR, 31X48, STERILE: Brand: MEDLINE

## (undated) DEVICE — VCARE SMALL, UTERINE MANIPULATOR, VAGINAL-CERVICAL-AHLUWALIA'S-RETRACTOR-ELEVATOR: Brand: VCARE

## (undated) DEVICE — COLUMN DRAPE

## (undated) DEVICE — TIP COVER ACCESSORY

## (undated) DEVICE — 40585 XL ADVANCED TRENDELENBURG POSITIONING KIT: Brand: 40585 XL ADVANCED TRENDELENBURG POSITIONING KIT

## (undated) DEVICE — KENDALL SCD EXPRESS SLEEVES, KNEE LENGTH, MEDIUM: Brand: KENDALL SCD

## (undated) DEVICE — [HIGH FLOW INSUFFLATOR,  DO NOT USE IF PACKAGE IS DAMAGED,  KEEP DRY,  KEEP AWAY FROM SUNLIGHT,  PROTECT FROM HEAT AND RADIOACTIVE SOURCES.]: Brand: PNEUMOSURE

## (undated) DEVICE — NEEDLE HYPO 21GA L1.5IN INTRAMUSCULAR S STL LATCH BVL UP

## (undated) DEVICE — BLADELESS OBTURATOR: Brand: WECK VISTA

## (undated) DEVICE — COVER,TABLE,44X76,STERILE: Brand: MEDLINE

## (undated) DEVICE — 2, DISPOSABLE SUCTION/IRRIGATOR WITHOUT DISPOSABLE TIP: Brand: STRYKEFLOW

## (undated) DEVICE — DRAPE XR C ARM 41X74IN LF --

## (undated) DEVICE — SOLUTION IRRIG 1000ML H2O STRL BLT

## (undated) DEVICE — (D)PREP SKN CHLRAPRP APPL 26ML -- CONVERT TO ITEM 371833

## (undated) DEVICE — SOLUTION LACTATED RINGERS INJECTION USP

## (undated) DEVICE — LAPAROSCOPIC TROCAR SLEEVE/SINGLE USE: Brand: KII® OPTICAL ACCESS SYSTEM

## (undated) DEVICE — FLOSEAL HEMOSTATIC MATRIX, 5 ML: Brand: FLOSEAL

## (undated) DEVICE — SEAL UNIV 5-8MM DISP BX/10 -- DA VINCI XI - SNGL USE

## (undated) DEVICE — SCISSORS SURG DIA8MM MPLR CRV ENDOWRIST

## (undated) DEVICE — GOWN,REINF,POLY,ECL,PP SLV,XL: Brand: MEDLINE

## (undated) DEVICE — CARDINAL HEALTH FLEXIBLE LIGHT HANDLE COVER: Brand: CARDINAL HEALTH

## (undated) DEVICE — MEGA SUTURECUT ND: Brand: ENDOWRIST

## (undated) DEVICE — SYR BULB 60ML IRRIGATION -- CONVERT TO ITEM 116413

## (undated) DEVICE — 2000CC GUARDIAN II: Brand: GUARDIAN

## (undated) DEVICE — REM POLYHESIVE ADULT PATIENT RETURN ELECTRODE: Brand: VALLEYLAB

## (undated) DEVICE — CATHETER URETH 16FR BLLN 5CC SIL ALLY W/ SIL HYDRGEL 2 W F

## (undated) DEVICE — PERI-PAD,MODERATE: Brand: CURITY

## (undated) DEVICE — LAP CHOLE: Brand: MEDLINE INDUSTRIES, INC.

## (undated) DEVICE — AMD ANTIMICROBIAL GAUZE SPONGES 8 PLY USP TYPE VII: Brand: CURITY

## (undated) DEVICE — ELECTRO LUBE IS A SINGLE PATIENT USE DEVICE THAT IS INTENDED TO BE USED ON ELECTROSURGICAL ELECTRODES TO REDUCE STICKING.: Brand: KEY SURGICAL ELECTRO LUBE

## (undated) DEVICE — BAG DRNGE 4000ML CONT IRRIG ROUNDED TEARDROP SHP DISP

## (undated) DEVICE — 3M™ TEGADERM™ TRANSPARENT FILM DRESSING FRAME STYLE, 1624W, 2-3/8 IN X 2-3/4 IN (6 CM X 7 CM), 100/CT 4CT/CASE: Brand: 3M™ TEGADERM™

## (undated) DEVICE — APPLICATOR SEAL FLOSEAL 5MM+ --

## (undated) DEVICE — SPONGE LAP 18X18IN STRL -- 5/PK

## (undated) DEVICE — DRAPE,UNDERBUTTOCKS,PCH,STERILE: Brand: MEDLINE

## (undated) DEVICE — SYR 10ML LUER LOK 1/5ML GRAD --

## (undated) DEVICE — INSUFFLATION NEEDLE TO ESTABLISH PNEUMOPERITONEUM.: Brand: INSUFFLATION NEEDLE

## (undated) DEVICE — SUTURE MCRYL SZ 4-0 L27IN ABSRB UD L19MM PS-2 1/2 CIR PRIM Y426H

## (undated) DEVICE — VISUALIZATION SYSTEM: Brand: CLEARIFY

## (undated) DEVICE — SUTURE VCRL SZ 0 L27IN ABSRB UD L36MM CT-1 1/2 CIR J260H

## (undated) DEVICE — JELLY LUBRICATING 10GM PREFIL SYR LUBE

## (undated) DEVICE — CONTAINER SPEC HISTOLOGY 900ML POLYPR

## (undated) DEVICE — TRI-LUMEN FILTERED TUBE SET WITH ACTIVATED CHARCOAL FILTER: Brand: AIRSEAL

## (undated) DEVICE — FILTER SMK EVAC FLO CLR MEGADYNE

## (undated) DEVICE — TRAY PREP DRY W/ PREM GLV 2 APPL 6 SPNG 2 UNDPD 1 OVERWRAP

## (undated) DEVICE — ARM DRAPE

## (undated) DEVICE — DRAPE,TOP,102X53,STERILE: Brand: MEDLINE

## (undated) DEVICE — SUTURE V-LOC 180 SZ 0 L12IN ABSRB GRN L37MM GS-21 1/2 CIR VLOCL0316